# Patient Record
Sex: FEMALE | Race: WHITE | NOT HISPANIC OR LATINO | Employment: UNEMPLOYED | URBAN - METROPOLITAN AREA
[De-identification: names, ages, dates, MRNs, and addresses within clinical notes are randomized per-mention and may not be internally consistent; named-entity substitution may affect disease eponyms.]

---

## 2017-12-27 ENCOUNTER — HOSPITAL ENCOUNTER (OUTPATIENT)
Dept: PHYSICAL THERAPY | Facility: HOSPITAL | Age: 37
Setting detail: RECURRING SERIES
Discharge: HOME | End: 2018-04-03
Attending: NURSE PRACTITIONER | Admitting: NURSE PRACTITIONER

## 2018-06-26 ENCOUNTER — CONVERSION ENCOUNTER (OUTPATIENT)
Dept: FAMILY MEDICINE CLINIC | Facility: CLINIC | Age: 38
End: 2018-06-26

## 2018-06-26 ENCOUNTER — HOSPITAL ENCOUNTER (OUTPATIENT)
Dept: FAMILY MEDICINE CLINIC | Facility: CLINIC | Age: 38
Setting detail: SPECIMEN
Discharge: HOME | End: 2018-06-26
Attending: FAMILY MEDICINE | Admitting: FAMILY MEDICINE

## 2018-08-01 ENCOUNTER — HOSPITAL ENCOUNTER (OUTPATIENT)
Dept: ORTHOPEDIC SURGERY | Facility: CLINIC | Age: 38
Discharge: HOME | End: 2018-08-01
Attending: PODIATRIST | Admitting: PODIATRIST

## 2018-08-01 ENCOUNTER — CONVERSION ENCOUNTER (OUTPATIENT)
Dept: FAMILY MEDICINE CLINIC | Facility: CLINIC | Age: 38
End: 2018-08-01

## 2019-06-04 VITALS
DIASTOLIC BLOOD PRESSURE: 74 MMHG | HEART RATE: 102 BPM | HEART RATE: 66 BPM | SYSTOLIC BLOOD PRESSURE: 110 MMHG | HEIGHT: 67 IN | SYSTOLIC BLOOD PRESSURE: 110 MMHG | HEIGHT: 67 IN | BODY MASS INDEX: 24.33 KG/M2 | BODY MASS INDEX: 24.33 KG/M2 | OXYGEN SATURATION: 100 % | WEIGHT: 155 LBS | WEIGHT: 155 LBS | DIASTOLIC BLOOD PRESSURE: 70 MMHG

## 2021-01-18 ENCOUNTER — HOSPITAL ENCOUNTER (OUTPATIENT)
Facility: HOSPITAL | Age: 41
Setting detail: OBSERVATION
Discharge: HOME | End: 2021-01-20
Attending: INTERNAL MEDICINE | Admitting: INTERNAL MEDICINE

## 2021-01-18 DIAGNOSIS — R10.9 ABDOMINAL PAIN, UNSPECIFIED ABDOMINAL LOCATION: Primary | ICD-10-CM

## 2021-01-18 DIAGNOSIS — R17 JAUNDICE: ICD-10-CM

## 2021-01-18 DIAGNOSIS — F19.10 SUBSTANCE ABUSE (HCC): ICD-10-CM

## 2021-01-18 DIAGNOSIS — B16.9 ACUTE VIRAL HEPATITIS B WITHOUT COMA AND WITHOUT DELTA AGENT: ICD-10-CM

## 2021-01-18 LAB
B-HCG UR QL: NEGATIVE
BASOPHILS # BLD AUTO: 0 10*3/MM3 (ref 0–0.2)
BASOPHILS NFR BLD AUTO: 0.8 % (ref 0–1.5)
DEPRECATED RDW RBC AUTO: 56.4 FL (ref 37–54)
EOSINOPHIL # BLD AUTO: 0.1 10*3/MM3 (ref 0–0.4)
EOSINOPHIL NFR BLD AUTO: 2.3 % (ref 0.3–6.2)
ERYTHROCYTE [DISTWIDTH] IN BLOOD BY AUTOMATED COUNT: 19 % (ref 12.3–15.4)
HCT VFR BLD AUTO: 35.3 % (ref 34–46.6)
HGB BLD-MCNC: 12.1 G/DL (ref 12–15.9)
LYMPHOCYTES # BLD AUTO: 1.7 10*3/MM3 (ref 0.7–3.1)
LYMPHOCYTES NFR BLD AUTO: 30.8 % (ref 19.6–45.3)
MCH RBC QN AUTO: 29.2 PG (ref 26.6–33)
MCHC RBC AUTO-ENTMCNC: 34.3 G/DL (ref 31.5–35.7)
MCV RBC AUTO: 85 FL (ref 79–97)
MONOCYTES # BLD AUTO: 0.9 10*3/MM3 (ref 0.1–0.9)
MONOCYTES NFR BLD AUTO: 17 % (ref 5–12)
NEUTROPHILS NFR BLD AUTO: 2.7 10*3/MM3 (ref 1.7–7)
NEUTROPHILS NFR BLD AUTO: 49.1 % (ref 42.7–76)
NRBC BLD AUTO-RTO: 0.2 /100 WBC (ref 0–0.2)
PLATELET # BLD AUTO: 308 10*3/MM3 (ref 140–450)
PMV BLD AUTO: 9.9 FL (ref 6–12)
RBC # BLD AUTO: 4.16 10*6/MM3 (ref 3.77–5.28)
WBC # BLD AUTO: 5.6 10*3/MM3 (ref 3.4–10.8)

## 2021-01-18 PROCEDURE — 85610 PROTHROMBIN TIME: CPT | Performed by: PHYSICIAN ASSISTANT

## 2021-01-18 PROCEDURE — 80307 DRUG TEST PRSMV CHEM ANLYZR: CPT | Performed by: PHYSICIAN ASSISTANT

## 2021-01-18 PROCEDURE — 83690 ASSAY OF LIPASE: CPT | Performed by: PHYSICIAN ASSISTANT

## 2021-01-18 PROCEDURE — 96375 TX/PRO/DX INJ NEW DRUG ADDON: CPT

## 2021-01-18 PROCEDURE — 82077 ASSAY SPEC XCP UR&BREATH IA: CPT | Performed by: PHYSICIAN ASSISTANT

## 2021-01-18 PROCEDURE — 25010000002 ONDANSETRON PER 1 MG: Performed by: PHYSICIAN ASSISTANT

## 2021-01-18 PROCEDURE — 80179 DRUG ASSAY SALICYLATE: CPT | Performed by: PHYSICIAN ASSISTANT

## 2021-01-18 PROCEDURE — 87086 URINE CULTURE/COLONY COUNT: CPT | Performed by: PHYSICIAN ASSISTANT

## 2021-01-18 PROCEDURE — 99284 EMERGENCY DEPT VISIT MOD MDM: CPT

## 2021-01-18 PROCEDURE — 80143 DRUG ASSAY ACETAMINOPHEN: CPT | Performed by: PHYSICIAN ASSISTANT

## 2021-01-18 PROCEDURE — 81001 URINALYSIS AUTO W/SCOPE: CPT | Performed by: PHYSICIAN ASSISTANT

## 2021-01-18 PROCEDURE — 85025 COMPLETE CBC W/AUTO DIFF WBC: CPT | Performed by: PHYSICIAN ASSISTANT

## 2021-01-18 PROCEDURE — 80053 COMPREHEN METABOLIC PANEL: CPT | Performed by: PHYSICIAN ASSISTANT

## 2021-01-18 PROCEDURE — 81025 URINE PREGNANCY TEST: CPT | Performed by: PHYSICIAN ASSISTANT

## 2021-01-18 RX ORDER — ONDANSETRON 2 MG/ML
4 INJECTION INTRAMUSCULAR; INTRAVENOUS ONCE
Status: COMPLETED | OUTPATIENT
Start: 2021-01-18 | End: 2021-01-18

## 2021-01-18 RX ORDER — SODIUM CHLORIDE 0.9 % (FLUSH) 0.9 %
10 SYRINGE (ML) INJECTION AS NEEDED
Status: DISCONTINUED | OUTPATIENT
Start: 2021-01-18 | End: 2021-01-20 | Stop reason: HOSPADM

## 2021-01-18 RX ADMIN — ONDANSETRON 4 MG: 2 INJECTION, SOLUTION INTRAMUSCULAR; INTRAVENOUS at 23:41

## 2021-01-18 RX ADMIN — SODIUM CHLORIDE 1000 ML: 9 INJECTION, SOLUTION INTRAVENOUS at 23:41

## 2021-01-19 ENCOUNTER — APPOINTMENT (OUTPATIENT)
Dept: CT IMAGING | Facility: HOSPITAL | Age: 41
End: 2021-01-19

## 2021-01-19 ENCOUNTER — APPOINTMENT (OUTPATIENT)
Dept: GENERAL RADIOLOGY | Facility: HOSPITAL | Age: 41
End: 2021-01-19

## 2021-01-19 ENCOUNTER — ON CAMPUS - OUTPATIENT (OUTPATIENT)
Dept: URBAN - METROPOLITAN AREA HOSPITAL 77 | Facility: HOSPITAL | Age: 41
End: 2021-01-19
Payer: COMMERCIAL

## 2021-01-19 DIAGNOSIS — B18.2 CHRONIC VIRAL HEPATITIS C: ICD-10-CM

## 2021-01-19 DIAGNOSIS — B16.9 ACUTE HEPATITIS B WITHOUT DELTA-AGENT AND WITHOUT HEPATIC CO: ICD-10-CM

## 2021-01-19 DIAGNOSIS — R11.2 NAUSEA WITH VOMITING, UNSPECIFIED: ICD-10-CM

## 2021-01-19 DIAGNOSIS — F19.10 OTHER PSYCHOACTIVE SUBSTANCE ABUSE, UNCOMPLICATED: ICD-10-CM

## 2021-01-19 DIAGNOSIS — R10.9 UNSPECIFIED ABDOMINAL PAIN: ICD-10-CM

## 2021-01-19 PROBLEM — M54.50 CHRONIC LOW BACK PAIN: Status: ACTIVE | Noted: 2017-03-02

## 2021-01-19 PROBLEM — M51.369 DEGENERATION OF INTERVERTEBRAL DISC OF LUMBAR REGION: Status: ACTIVE | Noted: 2018-06-26

## 2021-01-19 PROBLEM — J45.909 ASTHMA: Status: ACTIVE | Noted: 2018-06-26

## 2021-01-19 PROBLEM — M54.50 CHRONIC LOW BACK PAIN: Chronic | Status: ACTIVE | Noted: 2017-03-02

## 2021-01-19 PROBLEM — G89.29 CHRONIC LOW BACK PAIN: Chronic | Status: ACTIVE | Noted: 2017-03-02

## 2021-01-19 PROBLEM — G89.29 CHRONIC LOW BACK PAIN: Status: ACTIVE | Noted: 2017-03-02

## 2021-01-19 PROBLEM — S92.354A NONDISPLACED FRACTURE OF FIFTH METATARSAL BONE, RIGHT FOOT, INITIAL ENCOUNTER FOR CLOSED FRACTURE: Status: ACTIVE | Noted: 2018-08-01

## 2021-01-19 PROBLEM — S92.354A NONDISPLACED FRACTURE OF FIFTH METATARSAL BONE, RIGHT FOOT, INITIAL ENCOUNTER FOR CLOSED FRACTURE: Status: RESOLVED | Noted: 2018-08-01 | Resolved: 2021-01-19

## 2021-01-19 PROBLEM — R10.10 PAIN OF UPPER ABDOMEN: Status: ACTIVE | Noted: 2021-01-19

## 2021-01-19 PROBLEM — R11.0 NAUSEA: Status: ACTIVE | Noted: 2021-01-19

## 2021-01-19 PROBLEM — J45.909 ASTHMA: Chronic | Status: ACTIVE | Noted: 2018-06-26

## 2021-01-19 PROBLEM — M51.36 DEGENERATION OF INTERVERTEBRAL DISC OF LUMBAR REGION: Status: ACTIVE | Noted: 2018-06-26

## 2021-01-19 LAB
ALBUMIN SERPL-MCNC: 3.5 G/DL (ref 3.5–5.2)
ALBUMIN SERPL-MCNC: 3.6 G/DL (ref 3.5–5.2)
ALBUMIN/GLOB SERPL: 1 G/DL
ALBUMIN/GLOB SERPL: 1.3 G/DL
ALP SERPL-CCNC: 231 U/L (ref 39–117)
ALP SERPL-CCNC: 277 U/L (ref 39–117)
ALT SERPL W P-5'-P-CCNC: 1770 U/L (ref 1–33)
ALT SERPL W P-5'-P-CCNC: 2134 U/L (ref 1–33)
AMPHET+METHAMPHET UR QL: POSITIVE
ANION GAP SERPL CALCULATED.3IONS-SCNC: 8 MMOL/L (ref 5–15)
ANION GAP SERPL CALCULATED.3IONS-SCNC: 9 MMOL/L (ref 5–15)
APAP SERPL-MCNC: <5 MCG/ML (ref 0–30)
AST SERPL-CCNC: 1232 U/L (ref 1–32)
AST SERPL-CCNC: 1647 U/L (ref 1–32)
B PARAPERT DNA SPEC QL NAA+PROBE: NOT DETECTED
B PERT DNA SPEC QL NAA+PROBE: NOT DETECTED
BACTERIA UR QL AUTO: ABNORMAL /HPF
BARBITURATES UR QL SCN: NEGATIVE
BASOPHILS # BLD AUTO: 0 10*3/MM3 (ref 0–0.2)
BASOPHILS NFR BLD AUTO: 0.7 % (ref 0–1.5)
BENZODIAZ UR QL SCN: NEGATIVE
BILIRUB SERPL-MCNC: 4.3 MG/DL (ref 0–1.2)
BILIRUB SERPL-MCNC: 5.4 MG/DL (ref 0–1.2)
BILIRUB UR QL STRIP: ABNORMAL
BUN SERPL-MCNC: 10 MG/DL (ref 6–20)
BUN SERPL-MCNC: 8 MG/DL (ref 6–20)
BUN/CREAT SERPL: 14.5 (ref 7–25)
BUN/CREAT SERPL: 16.7 (ref 7–25)
C PNEUM DNA NPH QL NAA+NON-PROBE: NOT DETECTED
CALCIUM SPEC-SCNC: 8.3 MG/DL (ref 8.6–10.5)
CALCIUM SPEC-SCNC: 8.9 MG/DL (ref 8.6–10.5)
CANNABINOIDS SERPL QL: NEGATIVE
CHLORIDE SERPL-SCNC: 105 MMOL/L (ref 98–107)
CHLORIDE SERPL-SCNC: 106 MMOL/L (ref 98–107)
CLARITY UR: ABNORMAL
CO2 SERPL-SCNC: 25 MMOL/L (ref 22–29)
CO2 SERPL-SCNC: 26 MMOL/L (ref 22–29)
COCAINE UR QL: NEGATIVE
COLOR UR: ABNORMAL
CREAT SERPL-MCNC: 0.55 MG/DL (ref 0.57–1)
CREAT SERPL-MCNC: 0.6 MG/DL (ref 0.57–1)
DEPRECATED RDW RBC AUTO: 56.4 FL (ref 37–54)
EOSINOPHIL # BLD AUTO: 0.1 10*3/MM3 (ref 0–0.4)
EOSINOPHIL NFR BLD AUTO: 3.1 % (ref 0.3–6.2)
ERYTHROCYTE [DISTWIDTH] IN BLOOD BY AUTOMATED COUNT: 19.1 % (ref 12.3–15.4)
ETHANOL UR QL: <0.01 %
FLUAV SUBTYP SPEC NAA+PROBE: NOT DETECTED
FLUBV RNA ISLT QL NAA+PROBE: NOT DETECTED
GFR SERPL CREATININE-BSD FRML MDRD: 112 ML/MIN/1.73
GFR SERPL CREATININE-BSD FRML MDRD: 124 ML/MIN/1.73
GLOBULIN UR ELPH-MCNC: 2.8 GM/DL
GLOBULIN UR ELPH-MCNC: 3.5 GM/DL
GLUCOSE SERPL-MCNC: 103 MG/DL (ref 65–99)
GLUCOSE SERPL-MCNC: 82 MG/DL (ref 65–99)
GLUCOSE UR STRIP-MCNC: NEGATIVE MG/DL
HADV DNA SPEC NAA+PROBE: NOT DETECTED
HAV IGM SERPL QL IA: ABNORMAL
HBV CORE IGM SERPL QL IA: REACTIVE
HBV SURFACE AG SERPL QL IA: ABNORMAL
HCOV 229E RNA SPEC QL NAA+PROBE: NOT DETECTED
HCOV HKU1 RNA SPEC QL NAA+PROBE: NOT DETECTED
HCOV NL63 RNA SPEC QL NAA+PROBE: NOT DETECTED
HCOV OC43 RNA SPEC QL NAA+PROBE: NOT DETECTED
HCT VFR BLD AUTO: 34.7 % (ref 34–46.6)
HCV AB SER DONR QL: REACTIVE
HGB BLD-MCNC: 11.8 G/DL (ref 12–15.9)
HGB UR QL STRIP.AUTO: NEGATIVE
HIV1+2 AB SER QL: NORMAL
HMPV RNA NPH QL NAA+NON-PROBE: NOT DETECTED
HPIV1 RNA SPEC QL NAA+PROBE: NOT DETECTED
HPIV2 RNA SPEC QL NAA+PROBE: NOT DETECTED
HPIV3 RNA NPH QL NAA+PROBE: NOT DETECTED
HPIV4 P GENE NPH QL NAA+PROBE: NOT DETECTED
HYALINE CASTS UR QL AUTO: ABNORMAL /LPF
INR PPP: 1.15 (ref 0.93–1.1)
KETONES UR QL STRIP: ABNORMAL
LARGE PLATELETS: NORMAL
LEUKOCYTE ESTERASE UR QL STRIP.AUTO: ABNORMAL
LIPASE SERPL-CCNC: 65 U/L (ref 13–60)
LYMPHOCYTES # BLD AUTO: 1.7 10*3/MM3 (ref 0.7–3.1)
LYMPHOCYTES NFR BLD AUTO: 36.6 % (ref 19.6–45.3)
M PNEUMO IGG SER IA-ACNC: NOT DETECTED
MCH RBC QN AUTO: 28.9 PG (ref 26.6–33)
MCHC RBC AUTO-ENTMCNC: 34 G/DL (ref 31.5–35.7)
MCV RBC AUTO: 85 FL (ref 79–97)
METHADONE UR QL SCN: NEGATIVE
MONOCYTES # BLD AUTO: 0.9 10*3/MM3 (ref 0.1–0.9)
MONOCYTES NFR BLD AUTO: 18.1 % (ref 5–12)
NEUTROPHILS NFR BLD AUTO: 2 10*3/MM3 (ref 1.7–7)
NEUTROPHILS NFR BLD AUTO: 41.5 % (ref 42.7–76)
NITRITE UR QL STRIP: NEGATIVE
NRBC BLD AUTO-RTO: 0.1 /100 WBC (ref 0–0.2)
OPIATES UR QL: NEGATIVE
OXYCODONE UR QL SCN: NEGATIVE
PH UR STRIP.AUTO: 5.5 [PH] (ref 5–8)
PLATELET # BLD AUTO: 241 10*3/MM3 (ref 140–450)
PMV BLD AUTO: 10 FL (ref 6–12)
POTASSIUM SERPL-SCNC: 3.6 MMOL/L (ref 3.5–5.2)
POTASSIUM SERPL-SCNC: 4.1 MMOL/L (ref 3.5–5.2)
PROCALCITONIN SERPL-MCNC: 0.5 NG/ML (ref 0–0.25)
PROT SERPL-MCNC: 6.3 G/DL (ref 6–8.5)
PROT SERPL-MCNC: 7.1 G/DL (ref 6–8.5)
PROT UR QL STRIP: ABNORMAL
PROTHROMBIN TIME: 12.6 SECONDS (ref 9.6–11.7)
RBC # BLD AUTO: 4.08 10*6/MM3 (ref 3.77–5.28)
RBC # UR: ABNORMAL /HPF
RBC MORPH BLD: NORMAL
REF LAB TEST METHOD: ABNORMAL
RHINOVIRUS RNA SPEC NAA+PROBE: NOT DETECTED
RSV RNA NPH QL NAA+NON-PROBE: NOT DETECTED
SALICYLATES SERPL-MCNC: 0.7 MG/DL
SARS-COV-2 RNA NPH QL NAA+NON-PROBE: NOT DETECTED
SMALL PLATELETS BLD QL SMEAR: ADEQUATE
SODIUM SERPL-SCNC: 138 MMOL/L (ref 136–145)
SODIUM SERPL-SCNC: 141 MMOL/L (ref 136–145)
SP GR UR STRIP: 1.03 (ref 1–1.03)
SQUAMOUS #/AREA URNS HPF: ABNORMAL /HPF
URATE CRY URNS QL MICRO: ABNORMAL /HPF
UROBILINOGEN UR QL STRIP: ABNORMAL
WBC # BLD AUTO: 4.8 10*3/MM3 (ref 3.4–10.8)
WBC MORPH BLD: NORMAL
WBC UR QL AUTO: ABNORMAL /HPF

## 2021-01-19 PROCEDURE — 96376 TX/PRO/DX INJ SAME DRUG ADON: CPT

## 2021-01-19 PROCEDURE — G0378 HOSPITAL OBSERVATION PER HR: HCPCS

## 2021-01-19 PROCEDURE — 85025 COMPLETE CBC W/AUTO DIFF WBC: CPT | Performed by: INTERNAL MEDICINE

## 2021-01-19 PROCEDURE — 99213 OFFICE O/P EST LOW 20 MIN: CPT | Performed by: NURSE PRACTITIONER

## 2021-01-19 PROCEDURE — 25010000002 ENOXAPARIN PER 10 MG: Performed by: NURSE PRACTITIONER

## 2021-01-19 PROCEDURE — 25010000002 ONDANSETRON PER 1 MG: Performed by: NURSE PRACTITIONER

## 2021-01-19 PROCEDURE — G0432 EIA HIV-1/HIV-2 SCREEN: HCPCS | Performed by: NURSE PRACTITIONER

## 2021-01-19 PROCEDURE — 71045 X-RAY EXAM CHEST 1 VIEW: CPT

## 2021-01-19 PROCEDURE — 0202U NFCT DS 22 TRGT SARS-COV-2: CPT | Performed by: NURSE PRACTITIONER

## 2021-01-19 PROCEDURE — 80074 ACUTE HEPATITIS PANEL: CPT | Performed by: PHYSICIAN ASSISTANT

## 2021-01-19 PROCEDURE — 25010000002 CEFTRIAXONE PER 250 MG: Performed by: NURSE PRACTITIONER

## 2021-01-19 PROCEDURE — 25010000002 ONDANSETRON PER 1 MG: Performed by: PHYSICIAN ASSISTANT

## 2021-01-19 PROCEDURE — 25010000002 PROCHLORPERAZINE 10 MG/2ML SOLUTION: Performed by: NURSE PRACTITIONER

## 2021-01-19 PROCEDURE — 84145 PROCALCITONIN (PCT): CPT | Performed by: NURSE PRACTITIONER

## 2021-01-19 PROCEDURE — 25010000002 KETOROLAC TROMETHAMINE PER 15 MG: Performed by: NURSE PRACTITIONER

## 2021-01-19 PROCEDURE — 96372 THER/PROPH/DIAG INJ SC/IM: CPT

## 2021-01-19 PROCEDURE — 80053 COMPREHEN METABOLIC PANEL: CPT | Performed by: INTERNAL MEDICINE

## 2021-01-19 PROCEDURE — 25010000002 MORPHINE PER 10 MG: Performed by: PHYSICIAN ASSISTANT

## 2021-01-19 PROCEDURE — 0 IOPAMIDOL PER 1 ML: Performed by: PHYSICIAN ASSISTANT

## 2021-01-19 PROCEDURE — 96365 THER/PROPH/DIAG IV INF INIT: CPT

## 2021-01-19 PROCEDURE — 96361 HYDRATE IV INFUSION ADD-ON: CPT

## 2021-01-19 PROCEDURE — 74177 CT ABD & PELVIS W/CONTRAST: CPT

## 2021-01-19 PROCEDURE — 99220 PR INITIAL OBSERVATION CARE/DAY 70 MINUTES: CPT | Performed by: INTERNAL MEDICINE

## 2021-01-19 PROCEDURE — 96375 TX/PRO/DX INJ NEW DRUG ADDON: CPT

## 2021-01-19 PROCEDURE — 85007 BL SMEAR W/DIFF WBC COUNT: CPT | Performed by: INTERNAL MEDICINE

## 2021-01-19 PROCEDURE — 87341 HEP B SURFACE AG NEUTRLZJ IA: CPT | Performed by: PHYSICIAN ASSISTANT

## 2021-01-19 RX ORDER — GABAPENTIN 300 MG/1
600 CAPSULE ORAL 4 TIMES DAILY
Status: DISCONTINUED | OUTPATIENT
Start: 2021-01-19 | End: 2021-01-20 | Stop reason: HOSPADM

## 2021-01-19 RX ORDER — OXYCODONE HYDROCHLORIDE 5 MG/1
5 TABLET ORAL EVERY 6 HOURS PRN
Status: DISCONTINUED | OUTPATIENT
Start: 2021-01-19 | End: 2021-01-19

## 2021-01-19 RX ORDER — MELATONIN
5000 DAILY
Status: DISCONTINUED | OUTPATIENT
Start: 2021-01-19 | End: 2021-01-20 | Stop reason: HOSPADM

## 2021-01-19 RX ORDER — PROCHLORPERAZINE EDISYLATE 5 MG/ML
2.5 INJECTION INTRAMUSCULAR; INTRAVENOUS EVERY 6 HOURS PRN
Status: DISCONTINUED | OUTPATIENT
Start: 2021-01-19 | End: 2021-01-20 | Stop reason: HOSPADM

## 2021-01-19 RX ORDER — GABAPENTIN 300 MG/1
600 CAPSULE ORAL EVERY 6 HOURS PRN
Status: DISCONTINUED | OUTPATIENT
Start: 2021-01-19 | End: 2021-01-19

## 2021-01-19 RX ORDER — MAGNESIUM SULFATE 1 G/100ML
1 INJECTION INTRAVENOUS AS NEEDED
Status: DISCONTINUED | OUTPATIENT
Start: 2021-01-19 | End: 2021-01-20 | Stop reason: HOSPADM

## 2021-01-19 RX ORDER — MAGNESIUM SULFATE HEPTAHYDRATE 40 MG/ML
2 INJECTION, SOLUTION INTRAVENOUS AS NEEDED
Status: DISCONTINUED | OUTPATIENT
Start: 2021-01-19 | End: 2021-01-20 | Stop reason: HOSPADM

## 2021-01-19 RX ORDER — MORPHINE SULFATE 4 MG/ML
4 INJECTION, SOLUTION INTRAMUSCULAR; INTRAVENOUS ONCE
Status: COMPLETED | OUTPATIENT
Start: 2021-01-19 | End: 2021-01-19

## 2021-01-19 RX ORDER — KETOROLAC TROMETHAMINE 15 MG/ML
15 INJECTION, SOLUTION INTRAMUSCULAR; INTRAVENOUS EVERY 6 HOURS PRN
Status: DISCONTINUED | OUTPATIENT
Start: 2021-01-19 | End: 2021-01-20 | Stop reason: HOSPADM

## 2021-01-19 RX ORDER — ONDANSETRON 2 MG/ML
4 INJECTION INTRAMUSCULAR; INTRAVENOUS ONCE
Status: COMPLETED | OUTPATIENT
Start: 2021-01-19 | End: 2021-01-19

## 2021-01-19 RX ORDER — POTASSIUM CHLORIDE 1.5 G/1.77G
40 POWDER, FOR SOLUTION ORAL AS NEEDED
Status: DISCONTINUED | OUTPATIENT
Start: 2021-01-19 | End: 2021-01-20 | Stop reason: HOSPADM

## 2021-01-19 RX ORDER — SODIUM CHLORIDE 0.9 % (FLUSH) 0.9 %
10 SYRINGE (ML) INJECTION EVERY 12 HOURS SCHEDULED
Status: DISCONTINUED | OUTPATIENT
Start: 2021-01-19 | End: 2021-01-20 | Stop reason: HOSPADM

## 2021-01-19 RX ORDER — NICOTINE 21 MG/24HR
1 PATCH, TRANSDERMAL 24 HOURS TRANSDERMAL DAILY
Status: DISCONTINUED | OUTPATIENT
Start: 2021-01-19 | End: 2021-01-20 | Stop reason: HOSPADM

## 2021-01-19 RX ORDER — FAMOTIDINE 10 MG/ML
20 INJECTION, SOLUTION INTRAVENOUS EVERY 12 HOURS SCHEDULED
Status: DISCONTINUED | OUTPATIENT
Start: 2021-01-19 | End: 2021-01-20 | Stop reason: HOSPADM

## 2021-01-19 RX ORDER — POTASSIUM CHLORIDE 20 MEQ/1
40 TABLET, EXTENDED RELEASE ORAL AS NEEDED
Status: DISCONTINUED | OUTPATIENT
Start: 2021-01-19 | End: 2021-01-20 | Stop reason: HOSPADM

## 2021-01-19 RX ORDER — POTASSIUM CHLORIDE 7.45 MG/ML
10 INJECTION INTRAVENOUS
Status: DISCONTINUED | OUTPATIENT
Start: 2021-01-19 | End: 2021-01-20 | Stop reason: HOSPADM

## 2021-01-19 RX ORDER — ALBUTEROL SULFATE 2.5 MG/3ML
2.5 SOLUTION RESPIRATORY (INHALATION) EVERY 6 HOURS PRN
Status: DISCONTINUED | OUTPATIENT
Start: 2021-01-19 | End: 2021-01-20 | Stop reason: HOSPADM

## 2021-01-19 RX ORDER — SODIUM CHLORIDE 0.9 % (FLUSH) 0.9 %
10 SYRINGE (ML) INJECTION AS NEEDED
Status: DISCONTINUED | OUTPATIENT
Start: 2021-01-19 | End: 2021-01-20 | Stop reason: HOSPADM

## 2021-01-19 RX ORDER — SUCRALFATE 1 G/1
1 TABLET ORAL
Status: DISCONTINUED | OUTPATIENT
Start: 2021-01-19 | End: 2021-01-20 | Stop reason: HOSPADM

## 2021-01-19 RX ORDER — SODIUM CHLORIDE 9 MG/ML
100 INJECTION, SOLUTION INTRAVENOUS CONTINUOUS
Status: DISCONTINUED | OUTPATIENT
Start: 2021-01-19 | End: 2021-01-20 | Stop reason: HOSPADM

## 2021-01-19 RX ORDER — ONDANSETRON 4 MG/1
4 TABLET, FILM COATED ORAL EVERY 6 HOURS PRN
Status: DISCONTINUED | OUTPATIENT
Start: 2021-01-19 | End: 2021-01-20 | Stop reason: HOSPADM

## 2021-01-19 RX ORDER — ONDANSETRON 2 MG/ML
4 INJECTION INTRAMUSCULAR; INTRAVENOUS EVERY 6 HOURS PRN
Status: DISCONTINUED | OUTPATIENT
Start: 2021-01-19 | End: 2021-01-20 | Stop reason: HOSPADM

## 2021-01-19 RX ADMIN — SUCRALFATE 1 G: 1 TABLET ORAL at 13:34

## 2021-01-19 RX ADMIN — ENOXAPARIN SODIUM 40 MG: 40 INJECTION SUBCUTANEOUS at 17:31

## 2021-01-19 RX ADMIN — ONDANSETRON 4 MG: 2 INJECTION, SOLUTION INTRAMUSCULAR; INTRAVENOUS at 01:48

## 2021-01-19 RX ADMIN — OXYCODONE 5 MG: 5 TABLET ORAL at 05:16

## 2021-01-19 RX ADMIN — Medication 5000 UNITS: at 10:07

## 2021-01-19 RX ADMIN — Medication 10 ML: at 20:16

## 2021-01-19 RX ADMIN — PROCHLORPERAZINE EDISYLATE 2.5 MG: 5 INJECTION INTRAMUSCULAR; INTRAVENOUS at 10:09

## 2021-01-19 RX ADMIN — Medication 10 ML: at 10:09

## 2021-01-19 RX ADMIN — KETOROLAC TROMETHAMINE 15 MG: 15 INJECTION, SOLUTION INTRAMUSCULAR; INTRAVENOUS at 10:08

## 2021-01-19 RX ADMIN — MORPHINE SULFATE 4 MG: 4 INJECTION INTRAVENOUS at 00:27

## 2021-01-19 RX ADMIN — SODIUM CHLORIDE 100 ML/HR: 9 INJECTION, SOLUTION INTRAVENOUS at 10:12

## 2021-01-19 RX ADMIN — FAMOTIDINE 20 MG: 10 INJECTION INTRAVENOUS at 10:08

## 2021-01-19 RX ADMIN — GABAPENTIN 600 MG: 300 CAPSULE ORAL at 13:34

## 2021-01-19 RX ADMIN — ONDANSETRON 4 MG: 2 INJECTION, SOLUTION INTRAMUSCULAR; INTRAVENOUS at 05:16

## 2021-01-19 RX ADMIN — SUCRALFATE 1 G: 1 TABLET ORAL at 17:31

## 2021-01-19 RX ADMIN — IOPAMIDOL 100 ML: 755 INJECTION, SOLUTION INTRAVENOUS at 01:05

## 2021-01-19 RX ADMIN — SODIUM CHLORIDE 100 ML/HR: 9 INJECTION, SOLUTION INTRAVENOUS at 21:50

## 2021-01-19 RX ADMIN — GABAPENTIN 600 MG: 300 CAPSULE ORAL at 20:16

## 2021-01-19 RX ADMIN — GABAPENTIN 600 MG: 300 CAPSULE ORAL at 17:31

## 2021-01-19 RX ADMIN — KETOROLAC TROMETHAMINE 15 MG: 15 INJECTION, SOLUTION INTRAMUSCULAR; INTRAVENOUS at 21:50

## 2021-01-19 RX ADMIN — CEFTRIAXONE SODIUM 1 G: 1 INJECTION, POWDER, FOR SOLUTION INTRAMUSCULAR; INTRAVENOUS at 10:08

## 2021-01-19 RX ADMIN — SUCRALFATE 1 G: 1 TABLET ORAL at 20:16

## 2021-01-19 RX ADMIN — FAMOTIDINE 20 MG: 10 INJECTION INTRAVENOUS at 20:16

## 2021-01-19 NOTE — PLAN OF CARE
Problem: Adult Inpatient Plan of Care  Goal: Plan of Care Review  Outcome: Ongoing, Progressing  Flowsheets (Taken 1/19/2021 0503)  Progress: no change  Plan of Care Reviewed With: patient  Outcome Summary: adfmitted with nausea and vomiting, awaiting orders. unable to eat much, with out pain.  Goal: Patient-Specific Goal (Individualized)  Outcome: Ongoing, Progressing  Goal: Absence of Hospital-Acquired Illness or Injury  Outcome: Ongoing, Progressing  Goal: Optimal Comfort and Wellbeing  Outcome: Ongoing, Progressing  Goal: Readiness for Transition of Care  Outcome: Ongoing, Progressing  Intervention: Mutually Develop Transition Plan  Recent Flowsheet Documentation  Taken 1/19/2021 0501 by Leslie Hernandez, RN  Transportation Anticipated: family or friend will provide  Patient/Family Anticipated Services at Transition: none  Patient/Family Anticipates Transition to: home  Taken 1/19/2021 0457 by Leslie Hernandez, RN  Equipment Currently Used at Home: none   Goal Outcome Evaluation:

## 2021-01-19 NOTE — ED PROVIDER NOTES
Subjective   Chief Complaint: Abdominal pain    Patient is a 40-year-old  female with history of hep C, asthma presents the ER with chief complaint of abdominal pain and nausea for 1.5 weeks.  Patient states she started feeling nauseous and then started to develop some left lower quadrant and left upper quadrant abdominal pain and distention afterwards.  Patient describes the pain as a intermittent squeezing pain that she rates 8/10.  Some nausea, no vomiting or diarrhea.  She also reports some mild dysuria.  Patient denies any hematochezia, she does report dark stools, states that she has been taking Tums and drinking Pepto almost every day.  She denies any chest pain cough or shortness of breath.  Patient states she was diagnosed with Covid 3 months ago.  She denies any fever or chills.  She does report intermittent lightheadedness but no changes in vision.  Patient also states about 4 days ago she noticed that her eyes seem to turn yellow as well as her skin.  Patient abdominal surgical history significant for tubal ligation and cholecystectomy. She states she was diagnosed with Hepatitis C in 2018, but states she never received treatment due to being in retirement. She denies excessive alcohol intake. Patient states she has history of IV drug use, last use about 6 months ago.    Location: Abdomen    Quality: Squeezing    Duration: 1.5 weeks    Timing: Intermittent    Severity: Moderate    Associated Symptoms: Nausea, jaundice    PCP: none      History provided by:  Patient      Review of Systems   Constitutional: Negative for chills and fever.   HENT: Negative for sore throat and trouble swallowing.    Respiratory: Negative for cough, shortness of breath and wheezing.    Cardiovascular: Negative for chest pain.   Gastrointestinal: Positive for abdominal pain and nausea. Negative for constipation, diarrhea and vomiting.        Dark stool   Genitourinary: Positive for dysuria. Negative for difficulty urinating,  vaginal bleeding and vaginal discharge.   Musculoskeletal: Negative for myalgias.   Skin: Positive for color change. Negative for rash.        Jaundice   Neurological: Positive for weakness and light-headedness. Negative for dizziness and headaches.   Psychiatric/Behavioral: Negative for behavioral problems.   All other systems reviewed and are negative.      Past Medical History:   Diagnosis Date   • Asthma    • Hepatitis C 01/0/2019       No Known Allergies    Past Surgical History:   Procedure Laterality Date   • CHOLECYSTECTOMY     • DILATATION AND CURETTAGE     • TONSILLECTOMY     • TUBAL ABDOMINAL LIGATION         No family history on file.    Social History     Socioeconomic History   • Marital status:      Spouse name: Not on file   • Number of children: Not on file   • Years of education: Not on file   • Highest education level: Not on file   Tobacco Use   • Smoking status: Current Every Day Smoker     Packs/day: 1.00   • Smokeless tobacco: Never Used   Substance and Sexual Activity   • Alcohol use: Not Currently   • Drug use: Defer   • Sexual activity: Defer           Objective   Physical Exam  Vitals signs and nursing note reviewed.   Constitutional:       Appearance: Normal appearance. She is well-developed and normal weight. She is not ill-appearing or toxic-appearing.   HENT:      Head: Normocephalic and atraumatic.      Mouth/Throat:      Mouth: Mucous membranes are moist.      Pharynx: No oropharyngeal exudate.   Eyes:      General: Scleral icterus present.      Pupils: Pupils are equal, round, and reactive to light.   Cardiovascular:      Rate and Rhythm: Normal rate and regular rhythm.      Heart sounds: Normal heart sounds. No murmur.   Pulmonary:      Effort: Pulmonary effort is normal. No respiratory distress.      Breath sounds: Normal breath sounds. No wheezing.   Abdominal:      General: Abdomen is flat. Bowel sounds are normal. There is no distension.      Palpations: Abdomen is soft.  "     Tenderness: There is abdominal tenderness in the epigastric area, left upper quadrant and left lower quadrant. There is no right CVA tenderness, left CVA tenderness, guarding or rebound.   Genitourinary:     Rectum: Guaiac result negative.   Skin:     General: Skin is warm and dry.      Capillary Refill: Capillary refill takes less than 2 seconds.      Coloration: Skin is jaundiced.      Findings: No rash.   Neurological:      General: No focal deficit present.      Mental Status: She is alert and oriented to person, place, and time.   Psychiatric:         Behavior: Behavior normal.         Procedures           ED Course  ED Course as of Jan 19 0311   Tue Jan 19, 2021   0245 Spoke with patient at length regarding lab work and imaging results.  All questions answered at this time    [MM]   0256 Spoke with ARNOLD Willingham who agreed except patient for Dr. Macdonald    [MM]      ED Course User Index  [MM] Maggy Dowling PA    /58   Pulse 84   Temp 98.2 °F (36.8 °C)   Resp 15   Ht 175.3 cm (69\")   Wt 55.1 kg (121 lb 6.4 oz)   LMP 01/04/2021   SpO2 96%   BMI 17.93 kg/m²   Labs Reviewed   COMPREHENSIVE METABOLIC PANEL - Abnormal; Notable for the following components:       Result Value    ALT (SGPT) 2,134 (*)     AST (SGOT) 1,647 (*)     Alkaline Phosphatase 277 (*)     Total Bilirubin 5.4 (*)     All other components within normal limits    Narrative:     GFR Normal >60  Chronic Kidney Disease <60  Kidney Failure <15     PROTIME-INR - Abnormal; Notable for the following components:    Protime 12.6 (*)     INR 1.15 (*)     All other components within normal limits   LIPASE - Abnormal; Notable for the following components:    Lipase 65 (*)     All other components within normal limits   URINALYSIS W/ CULTURE IF INDICATED - Abnormal; Notable for the following components:    Color, UA Orange (*)     Appearance, UA Cloudy (*)     Specific Gravity, UA 1.032 (*)     Ketones, UA Trace (*)     Bilirubin, UA Large (3+) " (*)     Protein, UA Trace (*)     Leuk Esterase, UA Small (1+) (*)     All other components within normal limits   CBC WITH AUTO DIFFERENTIAL - Abnormal; Notable for the following components:    RDW 19.0 (*)     RDW-SD 56.4 (*)     Monocyte % 17.0 (*)     All other components within normal limits   URINALYSIS, MICROSCOPIC ONLY - Abnormal; Notable for the following components:    RBC, UA 0-2 (*)     WBC, UA 6-12 (*)     Bacteria, UA Trace (*)     Squamous Epithelial Cells, UA 13-20 (*)     All other components within normal limits   HEPATITIS PANEL, ACUTE - Abnormal; Notable for the following components:    Hep B C IgM Reactive (*)     Hepatitis C Ab Reactive (*)     All other components within normal limits    Narrative:     Results may be falsely decreased if patient taking Biotin.   Preliminary reactive result pending confirmation at [facility].    URINE DRUG SCREEN - Abnormal; Notable for the following components:    Amphet/Methamphet, Screen Positive (*)     All other components within normal limits    Narrative:     Negative Thresholds For Drugs Screened:     Amphetamines               500 ng/ml   Barbiturates               200 ng/ml   Benzodiazepines            100 ng/ml   Cocaine                    300 ng/ml   Methadone                  300 ng/ml   Opiates                    300 ng/ml   Oxycodone                  100 ng/ml   THC                        50 ng/ml    The Normal Value for all drugs tested is negative. This report includes final unconfirmed screening results to be used for medical treatment purposes only. Unconfirmed results must not be used for non-medical purposes such as employment or legal testing. Clinical consideration should be applied to any drug of abuse test, particulary when unconfirmed results are used.  All urine drugs of abuse requests without chain of custody are for medical screening purposes only.  False positives are possible.     PREGNANCY, URINE - Normal   ACETAMINOPHEN LEVEL -  Normal    Narrative:     Acetaminophen Therapeutic Range  5-20 ug/mL      Hours after ingestion            Toxic Value    4 Hours                           150 ug/mL    8 Hours                            70 ug/mL   12 Hours                            40 ug/mL   16 Hours                            20 ug/mL    These values apply to a single ingestion only.    SALICYLATE LEVEL - Normal   URINE CULTURE   ETHANOL    Narrative:     Plasma Ethanol Clinical Symptoms:    ETOH (%)               Clinical Symptom  .01-.05              No apparent influence  .03-.12              Euphoria, Diminished judgment and attention   .09-.25              Impaired comprehension, Muscle incoordination  .18-.30              Confusion, Staggered gait, Slurred speech  .25-.40              Markedly decreased response to stimuli, unable to stand or                        walk, vomitting, sleep or stupor  .35-.50              Comatose, Anesthesia, Subnormal body temperature       HEPATITIS B SURFACE ANTIGEN   CBC AND DIFFERENTIAL    Narrative:     The following orders were created for panel order CBC & Differential.  Procedure                               Abnormality         Status                     ---------                               -----------         ------                     CBC Auto Differential[[id number]]        Abnormal            Final result                 Please view results for these tests on the individual orders.     Medications   sodium chloride 0.9 % flush 10 mL (has no administration in time range)   sodium chloride 0.9 % bolus 1,000 mL (0 mL Intravenous Stopped 1/19/21 0142)   ondansetron (ZOFRAN) injection 4 mg (4 mg Intravenous Given 1/18/21 2341)   Morphine sulfate (PF) injection 4 mg (4 mg Intravenous Given 1/19/21 0027)   iopamidol (ISOVUE-370) 76 % injection 100 mL (100 mL Intravenous Given 1/19/21 0105)   ondansetron (ZOFRAN) injection 4 mg (4 mg Intravenous Given 1/19/21 0148)     Ct Abdomen Pelvis With  Contrast    Result Date: 1/18/2021  1. Hepatomegaly. 2. Mild patchy areas of opacity in the right lower lobe are likely inflammatory or infectious. A Covid pneumonia would be consideration the appropriate clinical setting. Electronically signed by:  Favio Cormier D.O.  1/18/2021 11:19 PM                                           MDM  Number of Diagnoses or Management Options  Abdominal pain, unspecified abdominal location:   Acute viral hepatitis B without coma and without delta agent:   Jaundice:   Substance abuse (CMS/HCC):   Diagnosis management comments: MEDICAL DECISION  Epic Chart Review: No chart to review in Epic  Comorbidities: Hep C, asthma  Differentials: Pancreatitis, hepatitis, choledocholithiasis; this list is not all inclusive and does not constitute the entirety of considered causes  Radiology interpretation:  Images reviewed by me and interpreted by radiologist,   CT abdomen pelvis shows hepatomegaly, mild patchy areas of opacity in the right lower lobe likely inflammatory or infectious  Lab interpretation:  Labs viewed by me significant for, hepatitis panel hep B core reactive, hep C antibody reactive.  CMP ALT 2134, AST 1647, alk phos 277, total bili 5.4.  PT/INR elevated 12.6/1.15.  Lipase elevated 65.  Urine pregnancy negative.  Urinalysis orange, cloudy, trace ketones, 3+ bilirubin, 2+ leukocytes, 13-20 squamous.  Urine culture pending.  CBC essentially normal.  Ethanol less than 0.01.  Acetaminophen level normal.  Urine drug screen positive for methamphetamines.  Salicylate level normal.    While in the ED IV was placed and labs were obtained appropriate PPE was worn during exam and throughout all encounters with the patient.  Patient had above evaluation.  Patient had IV established, lab work obtained.  Patient given morphine and Zofran for pain and nausea.  Patient also given IV fluid bolus.  Lab returned as noted above.  Based on elevated transaminases and total bili hepatitis panel  ordered, reactive for hepatitis B IgM, hep C core antibody.  Patient does admit to recent unprotected sexual encounter as well as IV drug use, where she shared a needle with her sister-in-law.  Lab work is otherwise unremarkable.  CT abdomen pelvis shows no acute intra-abdominal abnormalities such as infection, inflammatory Tory, small bowel obstruction or perforation.  Patient was admitted for GI consult for hep B, hep C, monitor LFTs.  Consult placed to hospitalist, spoke with ARNOLD Willingham who agreed accept patient for Dr. Macdonald.  Patient stable on admission.        Final diagnoses:   Abdominal pain, unspecified abdominal location   Acute viral hepatitis B without coma and without delta agent   Substance abuse (CMS/HCC)   Jaundice            Maggy Dowling PA  01/19/21 0317

## 2021-01-19 NOTE — PROGRESS NOTES
Continued Stay Note  JOCY Villeda     Patient Name: Briana Bolanos  MRN: [id number]  Today's Date: 2021    Admit Date: 2021    Discharge Plan     Row Name 21 1529       Plan    Plan  anticipate return home    Plan Comments  SW noted positive UDS for amphetamines. SW met with pt at the bedside to complete substance use assessment. Pt admitted to recent relapse, drug of choice is methamphetamine, per pt. Per pt, she lives alone and feels that her relapse is related to depression. Pt states that her   9 years ago. She has two adult children and one sister, who she states are supportive of her. SW offered additional resources and supports and pt denies need at this time.     Met with patient in room wearing PPE: mask, face shield/goggles.      Maintained distance greater than six feet and spent less than 15 minutes in the room.      Rosina Dior [facility] [place], [facility]    Office: [phone]  Cell: [phone]  Fax: [phone]  E-mail: [email or web address]

## 2021-01-19 NOTE — PLAN OF CARE
"Goal Outcome Evaluation:  Plan of Care Reviewed With: patient  Still nauseous, but she stated it is \"slightly better\"- tolerated oral foods and liquids at lunch. On IV fluids- see MAR     "

## 2021-01-19 NOTE — CONSULTS
"GI CONSULT  NOTE:    Referring Provider:  Dr. Macdonald    Chief complaint: Abnormal liver enzymes, abdominal pain    Subjective . \"  I have had abdominal pain with nausea and vomiting for weeks\"    History of present illness: Briana Bolanos is a 40 y.o. female who has a history of chronic hepatitis C and drug abuse with recent relapse.  She was apparently diagnosed with chronic hepatitis C in 2018 and is treatment naïve.  She had a recent relapse with IV drug abuse and shared needles with 1 other person and developed acute onset abdominal pain with nausea and vomiting over the last 1 to 2 weeks.  She has been taking Tums and Pepto without relief.  She started to have jaundice approximately 4 days ago.  Regular bowel movements that are dark on Pepto.  No rectal bleeding.  No fevers or chills.  She has had some recent weight loss with decreased oral intake secondary to abdominal pain.  Gallbladder is absent.  She reports previous vaccination for hepatitis B.    Endo History:  9/2014 colonoscopy by Dr. Anaya -8 mm polyp (TA) and hemorrhoids    Past Medical History:  Past Medical History:   Diagnosis Date   • Asthma    • Chronic low back pain 3/2/2017   • Degeneration of intervertebral disc of lumbar region 6/26/2018   • Hepatitis B 01/18/2021   • Hepatitis C 01/2019   • History of alcohol abuse    • IV drug abuse (CMS/HCC)    • Major depressive disorder, recurrent, moderate (CMS/HCC) 12/23/2014   • Methamphetamine abuse (CMS/HCC)    • Nondisplaced fracture of fifth metatarsal bone, right foot, initial encounter for closed fracture 8/1/2018   • Tobacco dependency        Past Surgical History:  Past Surgical History:   Procedure Laterality Date   • CHOLECYSTECTOMY     • DILATATION AND CURETTAGE     • TONSILLECTOMY     • TUBAL ABDOMINAL LIGATION         Social History:  Social History     Tobacco Use   • Smoking status: Current Every Day Smoker     Packs/day: 0.50     Years: 25.00     Pack years: 12.50     Types: " Cigarettes   • Smokeless tobacco: Never Used   Substance Use Topics   • Alcohol use: Not Currently   • Drug use: Yes     Types: Methamphetamines   Recent drug use    Family History:  Family History   Family history unknown: Yes       Medications:  Medications Prior to Admission   Medication Sig Dispense Refill Last Dose   • gabapentin (NEURONTIN) 600 MG tablet Take 600 mg by mouth 4 (Four) Times a Day.   1/18/2021 at Unknown time   • vitamin D (ERGOCALCIFEROL) 1.25 MG (83440 UT) capsule capsule    Past Week at Unknown time       Scheduled Meds:cefTRIAXone, 1 g, Intravenous, Q24H  cholecalciferol, 5,000 Units, Oral, Daily  enoxaparin, 40 mg, Subcutaneous, Daily  famotidine, 20 mg, Intravenous, Q12H  nicotine, 1 patch, Transdermal, Daily  sodium chloride, 10 mL, Intravenous, Q12H  sucralfate, 1 g, Oral, 4x Daily AC & at Bedtime      Continuous Infusions:sodium chloride, 100 mL/hr, Last Rate: 100 mL/hr (01/19/21 1012)      PRN Meds:.•  albuterol  •  gabapentin  •  ketamine (KETALAR) IVPB **AND** Ketamine Vital Signs & Assessment  •  ketorolac  •  magnesium sulfate **OR** magnesium sulfate in D5W 1g/100mL (PREMIX)  •  ondansetron **OR** ondansetron  •  potassium chloride **OR** potassium chloride **OR** potassium chloride  •  prochlorperazine  •  [COMPLETED] Insert peripheral IV **AND** sodium chloride  •  sodium chloride    ALLERGIES:  Patient has no known allergies.    ROS:  The following systems were reviewed   Constitution:  No fevers, chills, + unintentional weight loss  Skin: no rash, no jaundice  Eyes:  No blurry vision, no eye pain  HENT:  No change in hearing or smell  Resp:  No dyspnea or cough  CV:  No chest pain or palpitations  :  No dysuria, hematuria  Musculoskeletal:  No leg cramps or arthralgias  Neuro:  No tremor, no numbness  Psych:  No depression or confusion    Objective Resting on side of the bed, ill-appearing but no acute distress    Vital Signs:   Vitals:    01/19/21 0131 01/19/21 0231  "01/19/21 0331 01/19/21 0454   BP: 120/76 100/58 117/78 113/76   BP Location:    Right arm   Patient Position:    Lying   Pulse: 83 84 87 71   Resp: 16 15 16 18   Temp:    97.8 °F (36.6 °C)   TempSrc:    Oral   SpO2: 100% 96% 95% 100%   Weight:    55.3 kg (122 lb)   Height:    172.7 cm (68\")       Physical Exam:       General Appearance:    Awake and alert, in no acute distress   Head:    Normocephalic, without obvious abnormality, atraumatic   Throat:   No oral lesions, no thrush, oral mucosa moist   Lungs:     Respirations regular, even and unlabored   Chest Wall:    No abnormalities observed   Abdomen:     Soft, tender, nondistended   Rectal:     Deferred   Extremities:   Moves all extremities, no edema, no cyanosis   Pulses:   Pulses palpable and equal bilaterally   Skin:   No rash, + jaundice, normal palpation       Neurologic:   Cranial nerves 2 - 12 grossly intact, tearful       Results Review:   I reviewed the patient's labs and imaging.  CBC    Results from last 7 days   Lab Units 01/19/21  0730 01/18/21  2341   WBC 10*3/mm3 4.80 5.60   HEMOGLOBIN g/dL 11.8* 12.1   PLATELETS 10*3/mm3 241 308     CMP   Results from last 7 days   Lab Units 01/19/21  0730 01/18/21  2341   SODIUM mmol/L 138 141   POTASSIUM mmol/L 4.1 3.6   CHLORIDE mmol/L 105 106   CO2 mmol/L 25.0 26.0   BUN mg/dL 8 10   CREATININE mg/dL 0.55* 0.60   GLUCOSE mg/dL 103* 82   ALBUMIN g/dL 3.50 3.60   BILIRUBIN mg/dL 4.3* 5.4*   ALK PHOS U/L 231* 277*   AST (SGOT) U/L 1,232* 1,647*   ALT (SGPT) U/L 1,770* 2,134*   LIPASE U/L  --  65*     Cr Clearance Estimated Creatinine Clearance: 121.1 mL/min (A) (by C-G formula based on SCr of 0.55 mg/dL (L)).  Coag   Results from last 7 days   Lab Units 01/18/21  2341   INR  1.15*     HbA1C No results found for: HGBA1C  Blood Glucose No results found for: POCGLU  Infection   Results from last 7 days   Lab Units 01/19/21  0730   PROCALCITONIN ng/mL 0.50*     UA    Results from last 7 days   Lab Units " 01/18/21  2341   NITRITE UA  Negative   WBC UA /HPF 6-12*   BACTERIA UA /HPF Trace*   SQUAM EPITHEL UA /HPF 13-20*     Radiology(recent) Ct Abdomen Pelvis With Contrast    Result Date: 1/18/2021  1. Hepatomegaly. 2. Mild patchy areas of opacity in the right lower lobe are likely inflammatory or infectious. A Covid pneumonia would be consideration the appropriate clinical setting. Electronically signed by:  Favio Cormier D.O.  1/18/2021 11:19 PM    Xr Chest 1 View    Result Date: 1/19/2021  Ill-defined patchy peripheral right midlung and right basilar airspace disease. Correlate clinically for symptoms of pneumonia.  Electronically Signed By-Nehal Lockhart MD On:1/19/2021 8:38 AM This report was finalized on [id number] by  Nehal Lockhart MD.         ASSESSMENT:  Acute hepatitis B  Chronic hepatitis C  Drug abuse with recent relapse  Abdominal pain with nausea and vomiting  History of cholecystectomy      PLAN:  Patient presents with 1 to 2-week history of abdominal pain, nausea with vomiting and now jaundice.  She was found to have acute hepatitis B with a history of known chronic hepatitis C in the setting of recent drug abuse with relapse.  Monitor closely and continue with supportive care.  Add PPI and Carafate with oral nutrition encouraged.  Complete drug cessation encouraged.  Avoid hepatotoxic medications.  Will follow.    I discussed the patients findings and my recommendations with the patient.    We appreciate the referral    Evelyne Robles, ZENON  01/19/21  10:40 EST

## 2021-01-19 NOTE — H&P
[facility] Medicine Services      Patient Name: Briana Bolanos  : 1980  MRN: [id number]  Primary Care Physician: Niles, No Known  Date of admission: 2021    Patient Care Team:  Provider, No Known as PCP - General          Subjective   History Present Illness     Chief Complaint:   Chief Complaint   Patient presents with   • Abdominal Pain       Ms. Bolanos is a 40 y.o. female with a history asthma, hepatitis C, and IV drug abuse who presents to [facility] ED on 2021 complaining of abdominal pain and nausea. The patient states her symptoms started approximately 2 weeks ago. She states the pain is worse in her upper abdomen and severe. She reports nausea, but denies vomiting. She reports loose stools, but denies diarrhea. She states her skin and eyes have a yellow coloring to them. She denies any other complaints. She denies any exacerbating or alleviating factors.     The patient states she smokes 1/2 ppd cigarettes and reports a 12.5 pack-year history. She reports a history of alcohol abuse, but states she cut back to an occasional drink in the past 6 months. She reports a history of IV drug abuse. She initially stated she last used over 6 months ago, but upon further questioning she admits to using meth in the past week. She states she has previously seen a gastroenterologist and was getting worked up for treatment of her hepatitis C, but she was incarcerated in 2020 and never followed up upon her release.     Upon arrival to the ER the patient's labs were significant for alk phos 277, ALT 2,134, AST 1,647, bilirubin 5.4, lipase 65. She was positive for Hepatitis B and C. Her urine drug screen was positive for amphetamines. Her urinalysis was abnormal and culture reflexed. She was given morphine, Zofran, and IV fluids. She was admitted for further evaluation and treatment.          Review of Systems   Constitution: Positive for chills. Negative for  "fever.   Gastrointestinal: Positive for abdominal pain and nausea. Negative for vomiting.        \"loose\" stools   All other systems reviewed and are negative.        Personal History     Past Medical History:   Past Medical History:   Diagnosis Date   • Asthma    • Chronic low back pain 3/2/2017   • Degeneration of intervertebral disc of lumbar region 6/26/2018   • Hepatitis B 01/18/2021   • Hepatitis C 01/2019   • History of alcohol abuse    • IV drug abuse (CMS/HCC)    • Major depressive disorder, recurrent, moderate (CMS/HCC) 12/23/2014   • Methamphetamine abuse (CMS/HCC)    • Nondisplaced fracture of fifth metatarsal bone, right foot, initial encounter for closed fracture 8/1/2018   • Tobacco dependency        Surgical History:      Past Surgical History:   Procedure Laterality Date   • CHOLECYSTECTOMY     • DILATATION AND CURETTAGE     • TONSILLECTOMY     • TUBAL ABDOMINAL LIGATION         Family History: Family history is unknown by patient. Otherwise pertinent FHx was reviewed and unremarkable.     Social History:  reports that she has been smoking cigarettes. She has a 12.50 pack-year smoking history. She has never used smokeless tobacco. She reports previous alcohol use. She reports current drug use. Drug: Methamphetamines.      Medications:  Prior to Admission medications    Medication Sig Start Date End Date Taking? Authorizing Provider   gabapentin (NEURONTIN) 600 MG tablet Take 600 mg by mouth 4 (Four) Times a Day. 12/9/20  Yes Emergency, Nurse Compa, RN   vitamin D (ERGOCALCIFEROL) 1.25 MG (03507 UT) capsule capsule  12/16/20  Yes Emergency, Nurse Compa RN       Allergies:  No Known Allergies      Objective   Objective     Vital Signs  Temp:  [97.8 °F (36.6 °C)-98.6 °F (37 °C)] 97.8 °F (36.6 °C)  Heart Rate:  [] 71  Resp:  [14-18] 18  BP: (100-125)/(55-85) 113/76  SpO2:  [95 %-100 %] 100 %  on   ;   Device (Oxygen Therapy): room air  Body mass index is 18.55 kg/m².    Physical Exam  Vitals signs " reviewed.   Constitutional:       Appearance: She is normal weight.   HENT:      Head: Normocephalic and atraumatic.      Nose: Nose normal.      Mouth/Throat:      Mouth: Mucous membranes are moist.   Eyes:      Extraocular Movements: Extraocular movements intact.      Pupils: Pupils are equal, round, and reactive to light.   Neck:      Musculoskeletal: Normal range of motion and neck supple.   Cardiovascular:      Rate and Rhythm: Normal rate and regular rhythm.      Pulses: Normal pulses.      Heart sounds: Normal heart sounds.   Pulmonary:      Effort: Pulmonary effort is normal.      Breath sounds: Normal breath sounds.   Abdominal:      General: Abdomen is flat. Bowel sounds are decreased. There is no distension.      Tenderness: There is generalized abdominal tenderness.   Musculoskeletal: Normal range of motion.      Right lower leg: No edema.      Left lower leg: No edema.   Skin:     General: Skin is warm and dry.      Capillary Refill: Capillary refill takes less than 2 seconds.   Neurological:      General: No focal deficit present.      Mental Status: She is alert and oriented to person, place, and time.   Psychiatric:         Mood and Affect: Mood normal.         Behavior: Behavior normal.         Results Review:  I have personally reviewed most recent lab results, microbiology results and radiology images and interpretations and agree with findings.    Results from last 7 days   Lab Units 01/19/21  0730 01/18/21  2341   WBC 10*3/mm3 4.80 5.60   HEMOGLOBIN g/dL 11.8* 12.1   HEMATOCRIT % 34.7 35.3   PLATELETS 10*3/mm3 241 308   INR   --  1.15*     Results from last 7 days   Lab Units 01/19/21  0730 01/18/21  2341   SODIUM mmol/L  --  141   POTASSIUM mmol/L  --  3.6   CHLORIDE mmol/L  --  106   CO2 mmol/L  --  26.0   BUN mg/dL  --  10   CREATININE mg/dL  --  0.60   GLUCOSE mg/dL  --  82   CALCIUM mg/dL  --  8.9   ALT (SGPT) U/L  --  2,134*   AST (SGOT) U/L  --  1,647*   PROCALCITONIN ng/mL 0.50*  --       Estimated Creatinine Clearance: 111 mL/min (by C-G formula based on SCr of 0.6 mg/dL).  Brief Urine Lab Results  (Last result in the past 365 days)      Color   Clarity   Blood   Leuk Est   Nitrite   Protein   CREAT   Urine HCG        01/18/21 2341 Orange  Comment:  Result checked  Cloudy  Comment:  Result checked  Negative Small (1+) Negative Trace         01/18/21 2341               Negative           Microbiology Results (last 10 days)     ** No results found for the last 240 hours. **          ECG/EMG Results (most recent)     None          Ct Abdomen Pelvis With Contrast    Result Date: 1/18/2021  1. Hepatomegaly. 2. Mild patchy areas of opacity in the right lower lobe are likely inflammatory or infectious. A Covid pneumonia would be consideration the appropriate clinical setting. Electronically signed by:  Favio Cormier D.O.  1/18/2021 11:19 PM      Estimated Creatinine Clearance: 111 mL/min (by C-G formula based on SCr of 0.6 mg/dL).      Assessment/Plan   Assessment/Plan       Active Hospital Problems    Diagnosis  POA   • **Acute hepatitis B [B16.9]  Yes   • Pain of upper abdomen [R10.10]  Yes   • Nausea [R11.0]  Yes   • Hepatitis C [B19.20]  Yes   • Hyperbilirubinemia [E80.6]  Yes   • IV drug abuse (CMS/HCC) [F19.10]  Yes   • Methamphetamine abuse (CMS/HCC) [F15.10]  Yes   • Tobacco dependency [F17.200]  Yes   • History of alcohol abuse [F10.11]  Yes   • Asthma [J45.909]  Yes      Resolved Hospital Problems   No resolved problems to display.       Acute hepatitis B  -secondary to IVDA  -monitor and trend LFTs  -consult GI    Pain of upper abdomen, Nausea  -likely secondary to acute hepatitis  -NPO, IVF  -IV Pepcid BID  -PRN analgesia and antiemetics    Hepatitis C  -treatment naive   -needs outpatient follow up with GI    Hyperbilirubinemia  -bilirubin 5.4 on admission, likely secondary to acute hepatitis  -monitor and trend     IV drug abuse, Methamphetamine abuse  -patient reports last use in the  past week  -encourage cessation  -positive for Hep B and C  -HIV negative  -consult CM/SS    Possible UTI  -urine culture pending  -procalcitonin 0.50----start ceftriaxone and follow culture    Asthma  -stable without exacerbation  -PRN albuterol     Tobacco dependency  -encourage cessation  -nicotine patch     History of alcohol abuse          VTE Prophylaxis -   Mechanical Order History:     None      Pharmalogical Order History:      Ordered     Dose Route Frequency Stop    01/19/21 0505  enoxaparin (LOVENOX) syringe 40 mg      40 mg SC Daily --                CODE STATUS:    Code Status and Medical Interventions:   Ordered at: 01/19/21 0505     Code Status:    CPR     Medical Interventions (Level of Support Prior to Arrest):    Full       This patient has been examined wearing appropriate Personal Protective Equipment. 01/19/21      I discussed the patient's findings and my recommendations with patient and nursing staff.      Signature: Electronically signed by ZENON Mahan, 01/19/21, 9:41 AM EST.  [facility] Hospitalist Team      Attending attestation:    I performed a history and physical examination of the patient. I reviewed the nurse practitioner's note and agree with the documented findings and plan of care.    40 y.o. female with a history asthma, hepatitis C, and IV drug abuse who presents to [facility] ED on 01/18/2021 complaining of abdominal pain and nausea. The patient states her symptoms started approximately 2 weeks ago. She states the pain is worse in her upper abdomen and severe. She reports nausea, but denies vomiting. She reports loose stools, but denies diarrhea.     General: Awake, alert, NAD  Eyes: PERRL, EOMI, conjunctive are clear  Cardiovascular: Regular rate and rhythm, no murmurs  Respiratory: Clear to auscultation bilaterally, no wheezing or rales, unlabored breathing  Abdomen: Soft, generalized tenderness noted, positive bowel sounds, no guarding  Neurologic: A&O,  CN grossly intact, moves all extremities spontaneously  Musculoskeletal: Normal range of motion, no deformities  Skin: Warm, dry, intact    A/P:  Acute hepatitis B  Transaminitis secondary to acute hepatitis B  -secondary to IVDA  -monitor and trend LFTs  -Continue supportive care with IV fluids and symptomatic treatment  -Diet per GI    Chronic Hepatitis C  -treatment naive   -needs outpatient follow up with GI    Hyperbilirubinemia  -bilirubin 5.4 on admission, likely secondary to acute hepatitis  -monitor and trend     IV drug abuse, Methamphetamine abuse  -patient reports last use in the past week  -encourage cessation  -positive for Hep B and C  -HIV negative  -consult CM/SS    Possible UTI  -urine culture pending  -procalcitonin 0.50----start ceftriaxone and follow culture    Asthma  -stable without exacerbation  -PRN albuterol     Tobacco dependency  -encourage cessation  -nicotine patch     DVT prophylaxis  -Lovenox

## 2021-01-19 NOTE — PROGRESS NOTES
Discharge Planning Assessment   Ronal     Patient Name: Briana Bolanos  MRN: [id number]  Today's Date: 1/19/2021    Admit Date: 1/18/2021    Discharge Needs Assessment     Row Name 01/19/21 1303       Living Environment    Lives With  alone    Current Living Arrangements  home/apartment/condo    Potentially Unsafe Housing Conditions  unable to assess    Primary Care Provided by  self    Family Caregiver if Needed  sibling(s) sister helps    Quality of Family Relationships  unable to assess    Able to Return to Prior Arrangements  yes       Resource/Environmental Concerns    Resource/Environmental Concerns  none       Transition Planning    Patient/Family Anticipates Transition to  home with family    Patient/Family Anticipated Services at Transition  none       Discharge Needs Assessment    Readmission Within the Last 30 Days  no previous admission in last 30 days    Equipment Currently Used at Home  none    Concerns to be Addressed  no discharge needs identified    Provided Post Acute Provider List?  N/A    N/A Provider List Comment  no needs identified at this time        Discharge Plan     Row Name 01/19/21 1309       Plan    Plan  anticipate return home    Patient/Family in Agreement with Plan  yes    Plan Comments  spoke to patient in room with ppe(mask and goggles); staying 6 feet away and less than 15 mins in room; she lives alone and follows with md in [place]; her sister takes her to appointments; she does not have any dme; she gets medication at [facility] pharmacy in [place]       Carol naegele rn  Case management  Office number [phone]  Cell phone [phone]

## 2021-01-20 VITALS
OXYGEN SATURATION: 100 % | DIASTOLIC BLOOD PRESSURE: 65 MMHG | WEIGHT: 129.7 LBS | RESPIRATION RATE: 17 BRPM | SYSTOLIC BLOOD PRESSURE: 102 MMHG | HEART RATE: 62 BPM | HEIGHT: 68 IN | BODY MASS INDEX: 19.66 KG/M2 | TEMPERATURE: 98.6 F

## 2021-01-20 LAB
ALBUMIN SERPL-MCNC: 3.1 G/DL (ref 3.5–5.2)
ALBUMIN/GLOB SERPL: 1.1 G/DL
ALP SERPL-CCNC: 238 U/L (ref 39–117)
ALT SERPL W P-5'-P-CCNC: 1591 U/L (ref 1–33)
ANION GAP SERPL CALCULATED.3IONS-SCNC: 7 MMOL/L (ref 5–15)
AST SERPL-CCNC: 1194 U/L (ref 1–32)
BACTERIA SPEC AEROBE CULT: NO GROWTH
BASOPHILS # BLD AUTO: 0 10*3/MM3 (ref 0–0.2)
BASOPHILS NFR BLD AUTO: 0.9 % (ref 0–1.5)
BILIRUB SERPL-MCNC: 4.3 MG/DL (ref 0–1.2)
BUN SERPL-MCNC: 8 MG/DL (ref 6–20)
BUN/CREAT SERPL: 15.4 (ref 7–25)
CALCIUM SPEC-SCNC: 8 MG/DL (ref 8.6–10.5)
CHLORIDE SERPL-SCNC: 105 MMOL/L (ref 98–107)
CO2 SERPL-SCNC: 25 MMOL/L (ref 22–29)
CREAT SERPL-MCNC: 0.52 MG/DL (ref 0.57–1)
DEPRECATED RDW RBC AUTO: 56 FL (ref 37–54)
EOSINOPHIL # BLD AUTO: 0.1 10*3/MM3 (ref 0–0.4)
EOSINOPHIL NFR BLD AUTO: 3.5 % (ref 0.3–6.2)
ERYTHROCYTE [DISTWIDTH] IN BLOOD BY AUTOMATED COUNT: 18.9 % (ref 12.3–15.4)
GFR SERPL CREATININE-BSD FRML MDRD: 132 ML/MIN/1.73
GLOBULIN UR ELPH-MCNC: 2.7 GM/DL
GLUCOSE SERPL-MCNC: 98 MG/DL (ref 65–99)
HCT VFR BLD AUTO: 33.9 % (ref 34–46.6)
HGB BLD-MCNC: 11.6 G/DL (ref 12–15.9)
INR PPP: 1.2 (ref 0.93–1.1)
LIPASE SERPL-CCNC: 45 U/L (ref 13–60)
LYMPHOCYTES # BLD AUTO: 1.6 10*3/MM3 (ref 0.7–3.1)
LYMPHOCYTES NFR BLD AUTO: 41.7 % (ref 19.6–45.3)
MAGNESIUM SERPL-MCNC: 1.8 MG/DL (ref 1.6–2.6)
MCH RBC QN AUTO: 29.2 PG (ref 26.6–33)
MCHC RBC AUTO-ENTMCNC: 34.3 G/DL (ref 31.5–35.7)
MCV RBC AUTO: 85.1 FL (ref 79–97)
MONOCYTES # BLD AUTO: 0.7 10*3/MM3 (ref 0.1–0.9)
MONOCYTES NFR BLD AUTO: 17.5 % (ref 5–12)
NEUTROPHILS NFR BLD AUTO: 1.4 10*3/MM3 (ref 1.7–7)
NEUTROPHILS NFR BLD AUTO: 36.4 % (ref 42.7–76)
NRBC BLD AUTO-RTO: 0.3 /100 WBC (ref 0–0.2)
PLATELET # BLD AUTO: 205 10*3/MM3 (ref 140–450)
PMV BLD AUTO: 10.5 FL (ref 6–12)
POTASSIUM SERPL-SCNC: 3.9 MMOL/L (ref 3.5–5.2)
PROT SERPL-MCNC: 5.8 G/DL (ref 6–8.5)
PROTHROMBIN TIME: 13.1 SECONDS (ref 9.6–11.7)
RBC # BLD AUTO: 3.99 10*6/MM3 (ref 3.77–5.28)
SODIUM SERPL-SCNC: 137 MMOL/L (ref 136–145)
WBC # BLD AUTO: 3.8 10*3/MM3 (ref 3.4–10.8)

## 2021-01-20 PROCEDURE — 83735 ASSAY OF MAGNESIUM: CPT | Performed by: NURSE PRACTITIONER

## 2021-01-20 PROCEDURE — 80053 COMPREHEN METABOLIC PANEL: CPT | Performed by: NURSE PRACTITIONER

## 2021-01-20 PROCEDURE — 85610 PROTHROMBIN TIME: CPT | Performed by: NURSE PRACTITIONER

## 2021-01-20 PROCEDURE — G0378 HOSPITAL OBSERVATION PER HR: HCPCS

## 2021-01-20 PROCEDURE — 25010000002 CEFTRIAXONE PER 250 MG: Performed by: NURSE PRACTITIONER

## 2021-01-20 PROCEDURE — 83690 ASSAY OF LIPASE: CPT | Performed by: NURSE PRACTITIONER

## 2021-01-20 PROCEDURE — 99217 PR OBSERVATION CARE DISCHARGE MANAGEMENT: CPT | Performed by: INTERNAL MEDICINE

## 2021-01-20 PROCEDURE — 96376 TX/PRO/DX INJ SAME DRUG ADON: CPT

## 2021-01-20 PROCEDURE — 25010000002 PROCHLORPERAZINE 10 MG/2ML SOLUTION: Performed by: NURSE PRACTITIONER

## 2021-01-20 PROCEDURE — 99213 OFFICE O/P EST LOW 20 MIN: CPT | Performed by: NURSE PRACTITIONER

## 2021-01-20 PROCEDURE — 85025 COMPLETE CBC W/AUTO DIFF WBC: CPT | Performed by: NURSE PRACTITIONER

## 2021-01-20 RX ORDER — ONDANSETRON 4 MG/1
4 TABLET, FILM COATED ORAL EVERY 6 HOURS PRN
Qty: 20 TABLET | Refills: 0 | Status: SHIPPED | OUTPATIENT
Start: 2021-01-20

## 2021-01-20 RX ORDER — NICOTINE 21 MG/24HR
1 PATCH, TRANSDERMAL 24 HOURS TRANSDERMAL DAILY
Qty: 14 EACH | Refills: 0 | Status: SHIPPED | OUTPATIENT
Start: 2021-01-21

## 2021-01-20 RX ORDER — SUCRALFATE 1 G/1
1 TABLET ORAL
Qty: 40 TABLET | Refills: 0 | Status: SHIPPED | OUTPATIENT
Start: 2021-01-20

## 2021-01-20 RX ORDER — FAMOTIDINE 20 MG/1
20 TABLET, FILM COATED ORAL 2 TIMES DAILY
Qty: 30 TABLET | Refills: 0 | Status: SHIPPED | OUTPATIENT
Start: 2021-01-20

## 2021-01-20 RX ADMIN — PROCHLORPERAZINE EDISYLATE 2.5 MG: 5 INJECTION INTRAMUSCULAR; INTRAVENOUS at 10:31

## 2021-01-20 RX ADMIN — CEFTRIAXONE SODIUM 1 G: 1 INJECTION, POWDER, FOR SOLUTION INTRAMUSCULAR; INTRAVENOUS at 09:09

## 2021-01-20 RX ADMIN — SUCRALFATE 1 G: 1 TABLET ORAL at 09:10

## 2021-01-20 RX ADMIN — FAMOTIDINE 20 MG: 10 INJECTION INTRAVENOUS at 09:10

## 2021-01-20 RX ADMIN — Medication 5000 UNITS: at 09:09

## 2021-01-20 RX ADMIN — GABAPENTIN 600 MG: 300 CAPSULE ORAL at 09:10

## 2021-01-20 NOTE — DISCHARGE SUMMARY
[facility] Medicine Services  DISCHARGE SUMMARY        Prepared For PCP:  Provider, No Known    Patient Name: Briana Bolanos  : 1980  MRN: [id number]      Date of Admission:   2021    Date of Discharge:  2021    Length of stay:  LOS: 0 days     Hospital Course     Presenting Problem:   Substance abuse (CMS/HCC) [F19.10]  Jaundice [R17]  Acute viral hepatitis B without coma and without delta agent [B16.9]  Abdominal pain, unspecified abdominal location [R10.9]      Active Hospital Problems    Diagnosis  POA   • **Acute hepatitis B [B16.9]  Yes   • Pain of upper abdomen [R10.10]  Yes   • Nausea [R11.0]  Yes   • Hepatitis C [B19.20]  Yes   • Hyperbilirubinemia [E80.6]  Yes   • IV drug abuse (CMS/HCC) [F19.10]  Yes   • Methamphetamine abuse (CMS/HCC) [F15.10]  Yes   • Tobacco dependency [F17.200]  Yes   • History of alcohol abuse [F10.11]  Yes   • Asthma [J45.909]  Yes      Resolved Hospital Problems   No resolved problems to display.           Hospital Course:  Briana Bolanos is a 40 y.o. female with a history asthma, hepatitis C, and IV drug abuse who presents to [facility] ED on 2021 complaining of abdominal pain and nausea. The patient states her symptoms started approximately 2 weeks ago. She states the pain is worse in her upper abdomen and severe. She reports nausea, but denies vomiting. She reports loose stools, but denies diarrhea. She states her skin and eyes have a yellow coloring to them. The patient states she smokes 1/2 ppd cigarettes and reports a 12.5 pack-year history. She reports a history of alcohol abuse, but states she cut back to an occasional drink in the past 6 months. She reports a history of IV drug abuse. She initially stated she last used over 6 months ago, but upon further questioning she admits to using meth in the past week. She states she has previously seen a gastroenterologist and was getting worked up for treatment of her  hepatitis C, but she was incarcerated in March 2020 and never followed up upon her release. Upon arrival to the ER the patient's labs were significant for alk phos 277, ALT 2,134, AST 1,647, bilirubin 5.4, lipase 65. She was positive for Hepatitis B and C. Her urine drug screen was positive for amphetamines. Her urinalysis was abnormal and culture reflexed.  GI was consulted and patient started on symptomatic treatment with IV fluids, antiemetics, and IV Rocephin for possible UTI.  The next day patient has significantly improved and tolerating diet well without any nausea or abdominal pain.  Wants to go home today.  Urine culture shows no growth.  Antibiotics stopped.  Cleared to discharge from GI standpoint with outpatient follow-up for further treatment for hep C.  Patient is stable to discharge home today with follow-up with PCP and GI as an outpatient.          Recommendation for Outpatient Providers:     Follow-up with PCP and GI.        Reasons For Change In Medications and Indications for New Medications:    Pepcid, Carafate, Zofran as needed added.    Day of Discharge     HPI:   Patient seen and examined the day of discharge.  She feels significantly better, abdominal pain has resolved.  She is tolerating diet well.  Wants to go home today.  Agrees to follow-up with GI for further treatment of her hep C.  Cleared to discharge from GI standpoint.    Vital Signs:   Temp:  [98 °F (36.7 °C)-98.1 °F (36.7 °C)] 98 °F (36.7 °C)  Heart Rate:  [64] 64  Resp:  [17-20] 17  BP: ()/(63-65) 98/63     Physical Exam:  General: Awake, alert, NAD  Eyes: PERRL, EOMI, conjunctive are clear  Cardiovascular: Regular rate and rhythm, no murmurs  Respiratory: Clear to auscultation bilaterally, no wheezing or rales, unlabored breathing  Abdomen: Soft, nontender, positive bowel sounds, no guarding  Neurologic: A&O, CN grossly intact, moves all extremities spontaneously  Musculoskeletal: Normal range of motion, no  deformities  Skin: Warm, dry, intact      Pertinent  and/or Most Recent Results     Results from last 7 days   Lab Units 01/20/21  0313 01/19/21  0730 01/18/21  2341   WBC 10*3/mm3 3.80 4.80 5.60   HEMOGLOBIN g/dL 11.6* 11.8* 12.1   HEMATOCRIT % 33.9* 34.7 35.3   PLATELETS 10*3/mm3 205 241 308   SODIUM mmol/L 137 138 141   POTASSIUM mmol/L 3.9 4.1 3.6   CHLORIDE mmol/L 105 105 106   CO2 mmol/L 25.0 25.0 26.0   BUN mg/dL 8 8 10   CREATININE mg/dL 0.52* 0.55* 0.60   GLUCOSE mg/dL 98 103* 82   CALCIUM mg/dL 8.0* 8.3* 8.9     Results from last 7 days   Lab Units 01/20/21  0313 01/19/21  0730 01/18/21  2341   BILIRUBIN mg/dL 4.3* 4.3* 5.4*   ALK PHOS U/L 238* 231* 277*   ALT (SGPT) U/L 1,591* 1,770* 2,134*   AST (SGOT) U/L 1,194* 1,232* 1,647*   PROTIME Seconds 13.1*  --  12.6*   INR  1.20*  --  1.15*           Invalid input(s): TG, LDLCALC, LDLREALC  Results from last 7 days   Lab Units 01/19/21  0730   PROCALCITONIN ng/mL 0.50*       Brief Urine Lab Results  (Last result in the past 365 days)      Color   Clarity   Blood   Leuk Est   Nitrite   Protein   CREAT   Urine HCG        01/18/21 2341 Orange  Comment:  Result checked  Cloudy  Comment:  Result checked  Negative Small (1+) Negative Trace         01/18/21 2341               Negative           Microbiology Results Abnormal     Procedure Component Value - Date/Time    Urine Culture - Urine, Urine, Clean Catch [[id number]]  (Normal) Collected: 01/18/21 2341    Lab Status: Final result Specimen: Urine, Clean Catch Updated: 01/20/21 1012     Urine Culture No growth    Respiratory Panel PCR w/COVID-19(SARS-CoV-2) MARIO/ARIADNA/ZAC/PAD/COR/MAD/HOLLY In-House, NP Swab in UTM/VTM, 3-4 HR TAT - Swab, Nasopharynx [[id number]]  (Normal) Collected: 01/19/21 0404    Lab Status: Final result Specimen: Swab from Nasopharynx Updated: 01/19/21 0915     ADENOVIRUS, PCR Not Detected     Coronavirus 229E Not Detected     Coronavirus HKU1 Not Detected     Coronavirus NL63 Not Detected      Coronavirus OC43 Not Detected     COVID19 Not Detected     Human Metapneumovirus Not Detected     Human Rhinovirus/Enterovirus Not Detected     Influenza A PCR Not Detected     Influenza B PCR Not Detected     Parainfluenza Virus 1 Not Detected     Parainfluenza Virus 2 Not Detected     Parainfluenza Virus 3 Not Detected     Parainfluenza Virus 4 Not Detected     RSV, PCR Not Detected     Bordetella pertussis pcr Not Detected     Bordetella parapertussis PCR Not Detected     Chlamydophila pneumoniae PCR Not Detected     Mycoplasma pneumo by PCR Not Detected    Narrative:      Fact sheet for providers: https://docs.[employer]/wp-content/uploads/BRN8391-8425-JL1.1-EUA-Provider-Fact-Sheet-3.pdf    Fact sheet for patients: https://docs.[employer]/wp-content/uploads/UXW9484-8180-TY4.1-EUA-Patient-Fact-Sheet-1.pdf    Test performed by PCR.          Ct Abdomen Pelvis With Contrast    Result Date: 1/18/2021  Impression: 1. Hepatomegaly. 2. Mild patchy areas of opacity in the right lower lobe are likely inflammatory or infectious. A Covid pneumonia would be consideration the appropriate clinical setting. Electronically signed by:  Favio Cormier D.O.  1/18/2021 11:19 PM    Xr Chest 1 View    Result Date: 1/19/2021  Impression: Ill-defined patchy peripheral right midlung and right basilar airspace disease. Correlate clinically for symptoms of pneumonia.  Electronically Signed By-Nehal Lockhart MD On:1/19/2021 8:38 AM This report was finalized on [id number] by  Nehal Lockhart MD.                             Test Results Pending at Discharge  Pending Labs     Order Current Status    Hepatitis B Surface Antigen In process            Procedures Performed           Consults:   Consults     Date and Time Order Name Status Description    1/19/2021 0802 Inpatient Gastroenterology Consult Completed             Discharge Details        Discharge Medications      New Medications      Instructions Start Date   famotidine 20 MG  tablet  Commonly known as: Pepcid   20 mg, Oral, 2 Times Daily      nicotine 21 MG/24HR patch  Commonly known as: NICODERM CQ   1 patch, Transdermal, Daily   Start Date: January 21, 2021     ondansetron 4 MG tablet  Commonly known as: ZOFRAN   4 mg, Oral, Every 6 Hours PRN      sucralfate 1 g tablet  Commonly known as: CARAFATE   1 g, Oral, 4 Times Daily Before Meals & Nightly         Continue These Medications      Instructions Start Date   gabapentin 600 MG tablet  Commonly known as: NEURONTIN   600 mg, Oral, 4 Times Daily      vitamin D 1.25 MG (18517 UT) capsule capsule  Commonly known as: ERGOCALCIFEROL   No dose, route, or frequency recorded.             No Known Allergies      Discharge Disposition:  Home or Self Care    Diet:  Hospital:  Diet Order   Procedures   • Diet Regular         Discharge Activity:         CODE STATUS:    Code Status and Medical Interventions:   Ordered at: 01/19/21 0505     Code Status:    CPR     Medical Interventions (Level of Support Prior to Arrest):    Full         Follow-up Appointments  No future appointments.          Condition on Discharge:      Stable      This patient has been examined wearing appropriate Personal Protective Equipment on 01/20/21      Electronically signed by Dae Macdonald DO, 01/20/21, 10:25 AM EST.      Time: I spent  22  minutes on this discharge activity which included face-to-face encounter with the patient/reviewing the data in the system/coordination of the care with the nursing staff as well as consultants/documentation/entering orders.

## 2021-01-20 NOTE — PROGRESS NOTES
" LOS: 0 days   Patient Care Team:  Provider, No Known as PCP - General      Subjective \" I am feeling better\"    Interval History:     Subjective: Significantly less abdominal pain.  No nausea or vomiting and tolerating diet.  No fevers or chills.      ROS:   No chest pain, shortness of breath, or cough.        Medication Review:     Current Facility-Administered Medications:   •  albuterol (PROVENTIL) nebulizer solution 0.083% 2.5 mg/3mL, 2.5 mg, Nebulization, Q6H PRN, Angela Masterson, APRN  •  cefTRIAXone (ROCEPHIN) 1 g in sodium chloride 0.9 % 100 mL IVPB, 1 g, Intravenous, Q24H, Angela Masterson APRN, Last Rate: 200 mL/hr at 01/20/21 0909, 1 g at 01/20/21 0909  •  cholecalciferol (VITAMIN D3) tablet 5,000 Units, 5,000 Units, Oral, Daily, Tarsha Carlton FNP, 5,000 Units at 01/20/21 0909  •  enoxaparin (LOVENOX) syringe 40 mg, 40 mg, Subcutaneous, Daily, Tarsha Carlton FNP, 40 mg at 01/19/21 1731  •  famotidine (PEPCID) injection 20 mg, 20 mg, Intravenous, Q12H, Angela Masterson APRN, 20 mg at 01/20/21 0910  •  gabapentin (NEURONTIN) capsule 600 mg, 600 mg, Oral, 4x Daily, Dae Macdonald, DO, 600 mg at 01/20/21 0910  •  ketamine (KETALAR) 17 mg in sodium chloride 0.9 % 100 mL infusion, 17 mg, Intravenous, Daily PRN **AND** Ketamine Vital Signs & Assessment, , , Per Order Details, Angela Masterson, APRN  •  ketorolac (TORADOL) injection 15 mg, 15 mg, Intravenous, Q6H PRN, Angela Masterson, APRN, 15 mg at 01/19/21 2150  •  Magnesium Sulfate 2 gram infusion - Mg less than or equal to 1.5 mg/dL, 2 g, Intravenous, PRN **OR** Magnesium Sulfate 1 gram infusion - Mg 1.6-1.9 mg/dL, 1 g, Intravenous, PRN, Angela Masterson, APRN  •  nicotine (NICODERM CQ) 21 MG/24HR patch 1 patch, 1 patch, Transdermal, Daily, Angela Masterson, APRN  •  ondansetron (ZOFRAN) tablet 4 mg, 4 mg, Oral, Q6H PRN **OR** ondansetron (ZOFRAN) injection 4 mg, 4 mg, Intravenous, Q6H PRN, Tarsha Carlton FNP, 4 mg at 01/19/21 0516  •  potassium chloride (K-DUR,KLOR-CON) CR " "tablet 40 mEq, 40 mEq, Oral, PRN **OR** potassium chloride (KLOR-CON) packet 40 mEq, 40 mEq, Oral, PRN **OR** potassium chloride 10 mEq in 100 mL IVPB, 10 mEq, Intravenous, Q1H PRN, Masterson, Angela, APRN  •  prochlorperazine (COMPAZINE) injection 2.5 mg, 2.5 mg, Intravenous, Q6H PRN, Masterson, Angela, APRN, 2.5 mg at 01/19/21 1009  •  [COMPLETED] Insert peripheral IV, , , Once **AND** sodium chloride 0.9 % flush 10 mL, 10 mL, Intravenous, PRN, Maggy Dowling PA  •  sodium chloride 0.9 % flush 10 mL, 10 mL, Intravenous, Q12H, Tarsha Carlton, FNP, 10 mL at 01/19/21 2016  •  sodium chloride 0.9 % flush 10 mL, 10 mL, Intravenous, PRN, Tarsha Carlton, FNP  •  sodium chloride 0.9 % infusion, 100 mL/hr, Intravenous, Continuous, Masterson, Angela, APRN, Last Rate: 100 mL/hr at 01/19/21 2150, 100 mL/hr at 01/19/21 2150  •  sucralfate (CARAFATE) tablet 1 g, 1 g, Oral, 4x Daily AC & at Bedtime, Evelyne Robles, APRN, 1 g at 01/20/21 0910      Objective Resting in bed, no acute distress, nurse in room    Vital Signs  Vitals:    01/19/21 0331 01/19/21 0454 01/19/21 1901 01/20/21 0300   BP: 117/78 113/76 100/65 98/63   BP Location:  Right arm Right arm Right arm   Patient Position:  Lying Lying Lying   Pulse: 87 71 64    Resp: 16 18 20 17   Temp:  97.8 °F (36.6 °C) 98.1 °F (36.7 °C) 98 °F (36.7 °C)   TempSrc:  Oral Oral Oral   SpO2: 95% 100% 99% 98%   Weight:  55.3 kg (122 lb)  58.8 kg (129 lb 11.2 oz)   Height:  172.7 cm (68\")         Physical Exam:     General Appearance:    Awake and alert, in no acute distress   Head:    Normocephalic, without obvious abnormality   Eyes:          Conjunctivae normal, icteric sclera   Throat:   No oral lesions, no thrush, oral mucosa moist   Neck:   No adenopathy, supple, no JVD   Lungs:     respirations regular, even and unlabored   Abdomen:     Soft, tender, nondistended   Rectal:     Deferred   Extremities:   No edema, no cyanosis   Skin:   No bruising or rash,  jaundice        Results Review:    CBC  "   Results from last 7 days   Lab Units 01/20/21  0313 01/19/21  0730 01/18/21  2341   WBC 10*3/mm3 3.80 4.80 5.60   HEMOGLOBIN g/dL 11.6* 11.8* 12.1   PLATELETS 10*3/mm3 205 241 308     CMP   Results from last 7 days   Lab Units 01/20/21  0313 01/19/21  0730 01/18/21  2341   SODIUM mmol/L 137 138 141   POTASSIUM mmol/L 3.9 4.1 3.6   CHLORIDE mmol/L 105 105 106   CO2 mmol/L 25.0 25.0 26.0   BUN mg/dL 8 8 10   CREATININE mg/dL 0.52* 0.55* 0.60   GLUCOSE mg/dL 98 103* 82   ALBUMIN g/dL 3.10* 3.50 3.60   BILIRUBIN mg/dL 4.3* 4.3* 5.4*   ALK PHOS U/L 238* 231* 277*   AST (SGOT) U/L 1,194* 1,232* 1,647*   ALT (SGPT) U/L 1,591* 1,770* 2,134*   MAGNESIUM mg/dL 1.8  --   --    LIPASE U/L 45  --  65*     Cr Clearance Estimated Creatinine Clearance: 136.2 mL/min (A) (by C-G formula based on SCr of 0.52 mg/dL (L)).  Coag   Results from last 7 days   Lab Units 01/20/21  0313 01/18/21  2341   INR  1.20* 1.15*     HbA1C No results found for: HGBA1C  Blood Glucose No results found for: POCGLU  Infection   Results from last 7 days   Lab Units 01/19/21  0730   PROCALCITONIN ng/mL 0.50*     UA    Results from last 7 days   Lab Units 01/18/21  2341   NITRITE UA  Negative   WBC UA /HPF 6-12*   BACTERIA UA /HPF Trace*   SQUAM EPITHEL UA /HPF 13-20*     Radiology(recent) Ct Abdomen Pelvis With Contrast    Result Date: 1/18/2021  1. Hepatomegaly. 2. Mild patchy areas of opacity in the right lower lobe are likely inflammatory or infectious. A Covid pneumonia would be consideration the appropriate clinical setting. Electronically signed by:  Favio Cormier D.O.  1/18/2021 11:19 PM    Xr Chest 1 View    Result Date: 1/19/2021  Ill-defined patchy peripheral right midlung and right basilar airspace disease. Correlate clinically for symptoms of pneumonia.  Electronically Signed By-Nehal Lockhart MD On:1/19/2021 8:38 AM This report was finalized on [id number] by  Nehal Lockhart MD.         Assessment/Plan   Acute hepatitis B  Chronic  hepatitis C  Drug abuse with recent relapse  Abdominal pain with nausea and vomiting  History of cholecystectomy       PLAN:  LFTs improved today with stable INR.  She is tolerating diet without further nausea or vomiting and minimal abdominal pain.  Okay to discharge home from a GI standpoint.  We will plan repeat labs on Monday and to monitor her until resolved then reassessment of hepatitis B and hepatitis C will be done at that time with further treatment as indicated.  Dr. Love discussed highly contagious status with patient in detail yesterday.  Good nutrition and hydration encouraged.  Avoid hepatotoxic medications.  Okay to discharge home from GI standpoint with close follow-up in office.  We will see inpatient as needed, call questions or concerns.    Evelyne Robles, APRN  01/20/21  09:51 EST

## 2021-01-21 ENCOUNTER — TELEPHONE (OUTPATIENT)
Dept: PAIN MEDICINE | Facility: CLINIC | Age: 41
End: 2021-01-21

## 2021-01-21 NOTE — TELEPHONE ENCOUNTER
She is not my patient. Must be other Dr. Macdonald. As per her previous ED visit, seems like she saw Dr. Dae Macdonald.

## 2021-01-21 NOTE — TELEPHONE ENCOUNTER
I CALLED PT'S SISTER- MS. CHAUDHRY'S CELL PHONE NUMBER.     LEFT VOICE MAIL WITH DR. LUCIA BENAVIDES'S NUMBER [phone] FOR PT. TO CALL.

## 2021-01-21 NOTE — PROGRESS NOTES
Discharge Planning Assessment   Ronal     Patient Name: Briana Bolanos  MRN: [id number]  Today's Date: 1/21/2021    Admit Date: 1/18/2021          Plan    Final Discharge Disposition Code  01 - home or self-care    Final Note  return home       Carol naegele rn  Case management  Office number [phone]  Cell phone [phone]

## 2021-01-21 NOTE — TELEPHONE ENCOUNTER
Provider:     Caller: ESTEFANIA CHAUDHRY    Relationship to Patient: SISTER    Pharmacy: [facility]- [phone]    Phone Number: [phone]     Reason for Call: PT'S SISTER STATES THAT [facility] CALLED YESTERDAY TO SEE IF THEY CAN GET SOMETHING DIFFERENT THAN ZOFRAN SINCE IT IS EXPENSIVE.   THEY HAVE NOT HEARD BACK.  ASKING IF DR. BENAVIDES CAN PRESCRIBE SOMETHING ELSE.   PLEASE CALL TO ADVISE.

## 2021-01-28 LAB
HBV SURFACE AG SERPL QL IA: NORMAL
HBV SURFACE AG SERPL QL NT: NORMAL

## 2021-02-09 ENCOUNTER — OFFICE (OUTPATIENT)
Dept: URBAN - METROPOLITAN AREA CLINIC 64 | Facility: CLINIC | Age: 41
End: 2021-02-09
Payer: COMMERCIAL

## 2021-02-09 VITALS
SYSTOLIC BLOOD PRESSURE: 139 MMHG | HEART RATE: 94 BPM | WEIGHT: 123 LBS | DIASTOLIC BLOOD PRESSURE: 83 MMHG | HEIGHT: 69 IN

## 2021-02-09 DIAGNOSIS — R11.0 NAUSEA: ICD-10-CM

## 2021-02-09 DIAGNOSIS — B18.2 CHRONIC VIRAL HEPATITIS C: ICD-10-CM

## 2021-02-09 DIAGNOSIS — B16.9 ACUTE HEPATITIS B WITHOUT DELTA-AGENT AND WITHOUT HEPATIC CO: ICD-10-CM

## 2021-02-09 DIAGNOSIS — R53.83 OTHER FATIGUE: ICD-10-CM

## 2021-02-09 PROCEDURE — 99214 OFFICE O/P EST MOD 30 MIN: CPT | Performed by: NURSE PRACTITIONER

## 2021-02-09 RX ORDER — ONDANSETRON HYDROCHLORIDE 4 MG/1
24 TABLET, FILM COATED ORAL
Qty: 45 | Refills: 5 | Status: ACTIVE
Start: 2021-02-09

## 2021-02-09 RX ORDER — FAMOTIDINE 20 MG/1
40 TABLET, FILM COATED ORAL
Qty: 180 | Refills: 3 | Status: ACTIVE
Start: 2021-02-09

## 2021-03-16 ENCOUNTER — OFFICE (OUTPATIENT)
Dept: URBAN - METROPOLITAN AREA CLINIC 64 | Facility: CLINIC | Age: 41
End: 2021-03-16
Payer: COMMERCIAL

## 2021-03-16 VITALS — HEIGHT: 69 IN | HEIGHT: 69 IN | HEIGHT: 69 IN

## 2021-03-16 DIAGNOSIS — R53.83 OTHER FATIGUE: ICD-10-CM

## 2021-03-16 DIAGNOSIS — B16.9 ACUTE HEPATITIS B WITHOUT DELTA-AGENT AND WITHOUT HEPATIC CO: ICD-10-CM

## 2021-03-16 DIAGNOSIS — L65.9 NONSCARRING HAIR LOSS, UNSPECIFIED: ICD-10-CM

## 2021-03-16 DIAGNOSIS — Z86.010 PERSONAL HISTORY OF COLONIC POLYPS: ICD-10-CM

## 2021-03-16 PROCEDURE — 99213 OFFICE O/P EST LOW 20 MIN: CPT | Mod: 95 | Performed by: NURSE PRACTITIONER

## 2021-05-18 ENCOUNTER — OFFICE (OUTPATIENT)
Dept: URBAN - METROPOLITAN AREA CLINIC 64 | Facility: CLINIC | Age: 41
End: 2021-05-18
Payer: COMMERCIAL

## 2021-05-18 VITALS
SYSTOLIC BLOOD PRESSURE: 94 MMHG | WEIGHT: 119 LBS | HEART RATE: 88 BPM | HEIGHT: 69 IN | DIASTOLIC BLOOD PRESSURE: 64 MMHG

## 2021-05-18 DIAGNOSIS — B16.9 ACUTE HEPATITIS B WITHOUT DELTA-AGENT AND WITHOUT HEPATIC CO: ICD-10-CM

## 2021-05-18 DIAGNOSIS — R11.0 NAUSEA: ICD-10-CM

## 2021-05-18 PROCEDURE — 99213 OFFICE O/P EST LOW 20 MIN: CPT | Performed by: NURSE PRACTITIONER

## 2021-08-11 NOTE — PLAN OF CARE
Goal Outcome Evaluation:  Plan of Care Reviewed With: patient  Progress: no change  Outcome Summary: Patient has slept well throughout the night without any complaints. No new concerns at this time. Plans to D/C home. Will continue monitor.   [[id number]] : Indication for procedure:Unable to examine laryngeal structures with mirror exam.  Patient with excessive mucous in throat and fullness.\par scope # 44\par Topical anesthesia is established with viscous xylocaine 2%.\par A flexible fiberoptic laryngoscope is introduced through the nares.\par No masses or inflammation is noted in the nasopharynx.\par The tongue base and valleculae are clear.\par The posterior pharyngeal wall is negative.  The hypopharynx is unobstructed.\par The supraglottic larynx is unremarkable.\par Both vocal cords are fully mobile without any polyp or nodule seen.  The vocal cords have no diffuse edema.\par There is moderate edema overlying the arytenoid cartilages and inter-arytenoid space.\par No erythema is noted the posterior larynx.\par The subglottic space is clear.\par The voice has a  normal quality.\par \par

## 2022-03-11 ENCOUNTER — OFFICE (OUTPATIENT)
Dept: URBAN - METROPOLITAN AREA CLINIC 64 | Facility: CLINIC | Age: 42
End: 2022-03-11
Payer: COMMERCIAL

## 2022-03-11 VITALS
HEART RATE: 74 BPM | HEIGHT: 69 IN | SYSTOLIC BLOOD PRESSURE: 96 MMHG | DIASTOLIC BLOOD PRESSURE: 54 MMHG | WEIGHT: 176 LBS

## 2022-03-11 DIAGNOSIS — B18.2 CHRONIC VIRAL HEPATITIS C: ICD-10-CM

## 2022-03-11 PROCEDURE — 99212 OFFICE O/P EST SF 10 MIN: CPT | Performed by: NURSE PRACTITIONER

## 2025-05-14 ENCOUNTER — HOSPITAL ENCOUNTER (EMERGENCY)
Facility: HOSPITAL | Age: 45
Discharge: HOME | End: 2025-05-14
Attending: EMERGENCY MEDICINE
Payer: COMMERCIAL

## 2025-05-14 VITALS
SYSTOLIC BLOOD PRESSURE: 105 MMHG | BODY MASS INDEX: 30.31 KG/M2 | RESPIRATION RATE: 18 BRPM | WEIGHT: 200 LBS | HEIGHT: 68 IN | TEMPERATURE: 98.5 F | OXYGEN SATURATION: 96 % | HEART RATE: 73 BPM | DIASTOLIC BLOOD PRESSURE: 64 MMHG

## 2025-05-14 DIAGNOSIS — L72.3 SEBACEOUS CYST: Primary | ICD-10-CM

## 2025-05-14 PROCEDURE — 99283 EMERGENCY DEPT VISIT LOW MDM: CPT | Performed by: EMERGENCY MEDICINE

## 2025-05-14 PROCEDURE — 99283 EMERGENCY DEPT VISIT LOW MDM: CPT

## 2025-05-14 RX ORDER — CEPHALEXIN 500 MG/1
500 CAPSULE ORAL ONCE
Status: COMPLETED | OUTPATIENT
Start: 2025-05-14 | End: 2025-05-14

## 2025-05-14 RX ORDER — IBUPROFEN 600 MG/1
600 TABLET, FILM COATED ORAL EVERY 8 HOURS PRN
Qty: 15 TABLET | Refills: 0 | Status: SHIPPED | OUTPATIENT
Start: 2025-05-14 | End: 2025-05-14

## 2025-05-14 RX ORDER — IBUPROFEN 600 MG/1
600 TABLET, FILM COATED ORAL EVERY 8 HOURS PRN
Qty: 15 TABLET | Refills: 0 | Status: SHIPPED | OUTPATIENT
Start: 2025-05-14

## 2025-05-14 RX ORDER — IBUPROFEN 400 MG/1
400 TABLET, FILM COATED ORAL ONCE
Status: COMPLETED | OUTPATIENT
Start: 2025-05-14 | End: 2025-05-14

## 2025-05-14 RX ORDER — CEPHALEXIN 500 MG/1
500 CAPSULE ORAL 4 TIMES DAILY
Qty: 28 CAPSULE | Refills: 0 | Status: SHIPPED | OUTPATIENT
Start: 2025-05-14 | End: 2025-05-21

## 2025-05-14 RX ADMIN — CEPHALEXIN 500 MG: 500 CAPSULE ORAL at 23:11

## 2025-05-14 RX ADMIN — IBUPROFEN 400 MG: 400 TABLET, FILM COATED ORAL at 23:10

## 2025-05-15 NOTE — DISCHARGE INSTRUCTIONS
Take medication as prescribed. Apply warm compresses to area 15-20 minutes at a time 3-4 time a day. Return or see your Doctor in 2-3 days for re-evaluation.

## 2025-05-15 NOTE — FSED PROVIDER NOTE
Subjective   History of Present Illness  44 yof complains of a cyst to her shoulder. Pt notes it has been there for along time however over the past week it has become tender and inflamed. She notes she used to be able to squeeze pus out of the area but it hurts to touch. She denies fever.       Review of Systems   Constitutional: Negative.    Skin:         cyst   All other systems reviewed and are negative.      Past Medical History:   Diagnosis Date    Asthma     Chronic low back pain 3/2/2017    Degeneration of intervertebral disc of lumbar region 6/26/2018    Hepatitis B 01/18/2021    Hepatitis C 01/2019    History of alcohol abuse     IV drug abuse     Major depressive disorder, recurrent, moderate 12/23/2014    Methamphetamine abuse     Nondisplaced fracture of fifth metatarsal bone, right foot, initial encounter for closed fracture 8/1/2018    Tobacco dependency        No Known Allergies    Past Surgical History:   Procedure Laterality Date    CHOLECYSTECTOMY      DILATATION AND CURETTAGE      TONSILLECTOMY      TUBAL ABDOMINAL LIGATION         Family History   Family history unknown: Yes       Social History     Socioeconomic History    Marital status:    Tobacco Use    Smoking status: Every Day     Current packs/day: 0.50     Average packs/day: 0.5 packs/day for 25.0 years (12.5 ttl pk-yrs)     Types: Cigarettes    Smokeless tobacco: Never   Vaping Use    Vaping status: Never Used   Substance and Sexual Activity    Alcohol use: Not Currently    Drug use: Yes     Types: Methamphetamines    Sexual activity: Defer           Objective   Physical Exam  Vitals reviewed.   Constitutional:       Appearance: Normal appearance.   HENT:      Head: Normocephalic and atraumatic.   Neck:      Trachea: Trachea and phonation normal.        Comments: Tender erythematous area to left lateral shoulder  No fluctuance or surrounding cellulitis  No drainge  Cardiovascular:      Rate and Rhythm: Normal rate and regular  rhythm.      Pulses: Normal pulses.      Heart sounds: Normal heart sounds.   Pulmonary:      Effort: Pulmonary effort is normal.      Breath sounds: Normal breath sounds.   Neurological:      Mental Status: She is alert.         Procedures           ED Course                                           Medical Decision Making  44 yof complains of tender area to her left shoulder. I suspect the patient has an infected sebaceous cyst. Plan to treat with antibiotics and warm compresses. Advise patient to return in 2 days for re-evaluation. Patient is not immunocompromised, and there is no bullae, pain out of proportion, or rapid progression concerning for necrotizing fasciitis. Patient to be discharged home with Keflex with follow up.    Problems Addressed:  Sebaceous cyst: complicated acute illness or injury    Risk  Prescription drug management.        Final diagnoses:   Sebaceous cyst       ED Disposition  ED Disposition       ED Disposition   Discharge    Condition   Stable    Comment   --               [facility] - [address] [address]  [phone]        PATIENT CONNECTION - [facility] [address]  [phone]             Medication List        New Prescriptions      cephalexin 500 MG capsule  Commonly known as: KEFLEX  Take 1 capsule by mouth 4 (Four) Times a Day for 7 days.     ibuprofen 600 MG tablet  Commonly known as: ADVIL,MOTRIN  Take 1 tablet by mouth Every 8 (Eight) Hours As Needed (pain).               Where to Get Your Medications        These medications were sent to [facility] [id number] - [place], [place] - [address] [place] [address] - [phone]  - [phone] [address] [place] [address]      Phone: [phone]   cephalexin 500 MG capsule  ibuprofen 600 MG tablet